# Patient Record
Sex: MALE | Race: BLACK OR AFRICAN AMERICAN | NOT HISPANIC OR LATINO | Employment: FULL TIME | URBAN - METROPOLITAN AREA
[De-identification: names, ages, dates, MRNs, and addresses within clinical notes are randomized per-mention and may not be internally consistent; named-entity substitution may affect disease eponyms.]

---

## 2017-01-17 ENCOUNTER — OFFICE VISIT - HEALTHEAST (OUTPATIENT)
Dept: FAMILY MEDICINE | Facility: CLINIC | Age: 27
End: 2017-01-17

## 2017-01-17 DIAGNOSIS — G43.109 MIGRAINE WITH AURA AND WITHOUT STATUS MIGRAINOSUS, NOT INTRACTABLE: ICD-10-CM

## 2017-01-17 DIAGNOSIS — M25.461 SWELLING OF RIGHT KNEE JOINT: ICD-10-CM

## 2017-01-17 DIAGNOSIS — Z98.890 S/P ACL RECONSTRUCTION: ICD-10-CM

## 2017-01-17 ASSESSMENT — MIFFLIN-ST. JEOR: SCORE: 2006.4

## 2017-03-21 ENCOUNTER — OFFICE VISIT - HEALTHEAST (OUTPATIENT)
Dept: FAMILY MEDICINE | Facility: CLINIC | Age: 27
End: 2017-03-21

## 2017-03-21 DIAGNOSIS — R73.01 IMPAIRED FASTING GLUCOSE: ICD-10-CM

## 2017-03-21 DIAGNOSIS — Z00.00 WELL ADULT EXAM: ICD-10-CM

## 2017-03-21 DIAGNOSIS — F90.9 ADHD (ATTENTION DEFICIT HYPERACTIVITY DISORDER): ICD-10-CM

## 2017-03-21 DIAGNOSIS — Z86.19 HISTORY OF CHLAMYDIA INFECTION: ICD-10-CM

## 2017-03-21 LAB
CHOLEST SERPL-MCNC: 134 MG/DL
FASTING STATUS PATIENT QL REPORTED: YES
HBA1C MFR BLD: 5.5 % (ref 3.5–6)
HDLC SERPL-MCNC: 59 MG/DL
LDLC SERPL CALC-MCNC: 62 MG/DL
TRIGL SERPL-MCNC: 65 MG/DL

## 2017-03-21 ASSESSMENT — MIFFLIN-ST. JEOR: SCORE: 1982.02

## 2017-05-16 ENCOUNTER — COMMUNICATION - HEALTHEAST (OUTPATIENT)
Dept: FAMILY MEDICINE | Facility: CLINIC | Age: 27
End: 2017-05-16

## 2017-05-16 ENCOUNTER — OFFICE VISIT - HEALTHEAST (OUTPATIENT)
Dept: FAMILY MEDICINE | Facility: CLINIC | Age: 27
End: 2017-05-16

## 2017-05-16 ENCOUNTER — AMBULATORY - HEALTHEAST (OUTPATIENT)
Dept: FAMILY MEDICINE | Facility: CLINIC | Age: 27
End: 2017-05-16

## 2017-05-16 DIAGNOSIS — Z86.19 HISTORY OF CHLAMYDIA INFECTION: ICD-10-CM

## 2017-05-16 DIAGNOSIS — Z11.3 SCREEN FOR STD (SEXUALLY TRANSMITTED DISEASE): ICD-10-CM

## 2017-05-16 DIAGNOSIS — F90.9 ADHD (ATTENTION DEFICIT HYPERACTIVITY DISORDER): ICD-10-CM

## 2017-05-16 DIAGNOSIS — J02.9 SORE THROAT: ICD-10-CM

## 2017-05-16 ASSESSMENT — MIFFLIN-ST. JEOR: SCORE: 1997.05

## 2017-05-30 ENCOUNTER — COMMUNICATION - HEALTHEAST (OUTPATIENT)
Dept: FAMILY MEDICINE | Facility: CLINIC | Age: 27
End: 2017-05-30

## 2017-07-24 ENCOUNTER — OFFICE VISIT - HEALTHEAST (OUTPATIENT)
Dept: FAMILY MEDICINE | Facility: CLINIC | Age: 27
End: 2017-07-24

## 2017-07-24 DIAGNOSIS — Z11.3 ROUTINE SCREENING FOR STI (SEXUALLY TRANSMITTED INFECTION): ICD-10-CM

## 2017-07-24 DIAGNOSIS — F90.9 ADHD (ATTENTION DEFICIT HYPERACTIVITY DISORDER): ICD-10-CM

## 2017-07-24 LAB — HIV 1+2 AB+HIV1 P24 AG SERPL QL IA: NEGATIVE

## 2017-07-24 ASSESSMENT — MIFFLIN-ST. JEOR: SCORE: 1999.6

## 2017-07-25 LAB — SYPHILIS RPR SCREEN - HISTORICAL: NORMAL

## 2017-08-02 ENCOUNTER — COMMUNICATION - HEALTHEAST (OUTPATIENT)
Dept: FAMILY MEDICINE | Facility: CLINIC | Age: 27
End: 2017-08-02

## 2017-08-15 ENCOUNTER — COMMUNICATION - HEALTHEAST (OUTPATIENT)
Dept: FAMILY MEDICINE | Facility: CLINIC | Age: 27
End: 2017-08-15

## 2017-08-23 ENCOUNTER — OFFICE VISIT - HEALTHEAST (OUTPATIENT)
Dept: FAMILY MEDICINE | Facility: CLINIC | Age: 27
End: 2017-08-23

## 2017-08-23 DIAGNOSIS — F90.9 ADHD (ATTENTION DEFICIT HYPERACTIVITY DISORDER): ICD-10-CM

## 2017-08-23 ASSESSMENT — MIFFLIN-ST. JEOR: SCORE: 2027.95

## 2017-09-07 ENCOUNTER — RECORDS - HEALTHEAST (OUTPATIENT)
Dept: ADMINISTRATIVE | Facility: OTHER | Age: 27
End: 2017-09-07

## 2017-10-05 ENCOUNTER — OFFICE VISIT - HEALTHEAST (OUTPATIENT)
Dept: FAMILY MEDICINE | Facility: CLINIC | Age: 27
End: 2017-10-05

## 2017-10-05 DIAGNOSIS — F33.1 MODERATE EPISODE OF RECURRENT MAJOR DEPRESSIVE DISORDER (H): ICD-10-CM

## 2017-10-05 DIAGNOSIS — R82.90 ABNORMAL URINE ODOR: ICD-10-CM

## 2017-10-05 DIAGNOSIS — F41.9 ANXIETY: ICD-10-CM

## 2017-10-05 ASSESSMENT — MIFFLIN-ST. JEOR: SCORE: 2046.49

## 2017-10-23 ENCOUNTER — COMMUNICATION - HEALTHEAST (OUTPATIENT)
Dept: FAMILY MEDICINE | Facility: CLINIC | Age: 27
End: 2017-10-23

## 2017-11-01 ENCOUNTER — COMMUNICATION - HEALTHEAST (OUTPATIENT)
Dept: FAMILY MEDICINE | Facility: CLINIC | Age: 27
End: 2017-11-01

## 2017-12-18 ENCOUNTER — COMMUNICATION - HEALTHEAST (OUTPATIENT)
Dept: FAMILY MEDICINE | Facility: CLINIC | Age: 27
End: 2017-12-18

## 2017-12-26 ENCOUNTER — OFFICE VISIT - HEALTHEAST (OUTPATIENT)
Dept: FAMILY MEDICINE | Facility: CLINIC | Age: 27
End: 2017-12-26

## 2017-12-26 DIAGNOSIS — Z20.2 EXPOSURE TO STD: ICD-10-CM

## 2017-12-26 DIAGNOSIS — R30.0 DYSURIA: ICD-10-CM

## 2017-12-26 DIAGNOSIS — Z20.2 STD EXPOSURE: ICD-10-CM

## 2017-12-28 ENCOUNTER — AMBULATORY - HEALTHEAST (OUTPATIENT)
Dept: FAMILY MEDICINE | Facility: CLINIC | Age: 27
End: 2017-12-28

## 2017-12-28 ENCOUNTER — COMMUNICATION - HEALTHEAST (OUTPATIENT)
Dept: FAMILY MEDICINE | Facility: CLINIC | Age: 27
End: 2017-12-28

## 2018-01-18 ENCOUNTER — AMBULATORY - HEALTHEAST (OUTPATIENT)
Dept: FAMILY MEDICINE | Facility: CLINIC | Age: 28
End: 2018-01-18

## 2018-02-12 ENCOUNTER — OFFICE VISIT - HEALTHEAST (OUTPATIENT)
Dept: FAMILY MEDICINE | Facility: CLINIC | Age: 28
End: 2018-02-12

## 2018-02-12 DIAGNOSIS — Z20.2 EXPOSURE TO CHLAMYDIA: ICD-10-CM

## 2018-02-12 DIAGNOSIS — F90.9 ADHD (ATTENTION DEFICIT HYPERACTIVITY DISORDER): ICD-10-CM

## 2018-02-12 DIAGNOSIS — F41.9 ANXIETY: ICD-10-CM

## 2018-02-12 DIAGNOSIS — Z11.3 SCREEN FOR STD (SEXUALLY TRANSMITTED DISEASE): ICD-10-CM

## 2018-02-12 DIAGNOSIS — F33.1 MODERATE EPISODE OF RECURRENT MAJOR DEPRESSIVE DISORDER (H): ICD-10-CM

## 2018-02-12 LAB — HIV 1+2 AB+HIV1 P24 AG SERPL QL IA: NEGATIVE

## 2018-02-12 ASSESSMENT — MIFFLIN-ST. JEOR: SCORE: 2062.82

## 2018-02-13 LAB
C TRACH DNA SPEC QL PROBE+SIG AMP: NEGATIVE
N GONORRHOEA DNA SPEC QL NAA+PROBE: NEGATIVE
SYPHILIS RPR SCREEN - HISTORICAL: NORMAL

## 2018-03-23 ENCOUNTER — COMMUNICATION - HEALTHEAST (OUTPATIENT)
Dept: FAMILY MEDICINE | Facility: CLINIC | Age: 28
End: 2018-03-23

## 2018-04-21 ENCOUNTER — COMMUNICATION - HEALTHEAST (OUTPATIENT)
Dept: FAMILY MEDICINE | Facility: CLINIC | Age: 28
End: 2018-04-21

## 2018-05-07 ENCOUNTER — OFFICE VISIT - HEALTHEAST (OUTPATIENT)
Dept: FAMILY MEDICINE | Facility: CLINIC | Age: 28
End: 2018-05-07

## 2018-05-07 DIAGNOSIS — F33.1 MODERATE EPISODE OF RECURRENT MAJOR DEPRESSIVE DISORDER (H): ICD-10-CM

## 2018-05-07 DIAGNOSIS — F41.9 ANXIETY: ICD-10-CM

## 2018-05-07 DIAGNOSIS — Z13.1 ENCOUNTER FOR SCREENING FOR DIABETES MELLITUS: ICD-10-CM

## 2018-05-07 DIAGNOSIS — Z13.220 ENCOUNTER FOR SCREENING FOR LIPOID DISORDERS: ICD-10-CM

## 2018-05-07 DIAGNOSIS — Z11.3 SCREEN FOR STD (SEXUALLY TRANSMITTED DISEASE): ICD-10-CM

## 2018-05-07 DIAGNOSIS — F90.9 ADHD (ATTENTION DEFICIT HYPERACTIVITY DISORDER): ICD-10-CM

## 2018-05-07 DIAGNOSIS — G43.909 MIGRAINE: ICD-10-CM

## 2018-05-07 DIAGNOSIS — Z00.00 ENCOUNTER FOR GENERAL ADULT MEDICAL EXAMINATION WITHOUT ABNORMAL FINDINGS: ICD-10-CM

## 2018-05-07 ASSESSMENT — MIFFLIN-ST. JEOR: SCORE: 2027.95

## 2018-05-08 LAB
C TRACH DNA SPEC QL PROBE+SIG AMP: NEGATIVE
N GONORRHOEA DNA SPEC QL NAA+PROBE: NEGATIVE

## 2018-05-23 ENCOUNTER — COMMUNICATION - HEALTHEAST (OUTPATIENT)
Dept: FAMILY MEDICINE | Facility: CLINIC | Age: 28
End: 2018-05-23

## 2018-06-19 ENCOUNTER — COMMUNICATION - HEALTHEAST (OUTPATIENT)
Dept: FAMILY MEDICINE | Facility: CLINIC | Age: 28
End: 2018-06-19

## 2018-07-12 ENCOUNTER — COMMUNICATION - HEALTHEAST (OUTPATIENT)
Dept: FAMILY MEDICINE | Facility: CLINIC | Age: 28
End: 2018-07-12

## 2018-07-21 ENCOUNTER — COMMUNICATION - HEALTHEAST (OUTPATIENT)
Dept: FAMILY MEDICINE | Facility: CLINIC | Age: 28
End: 2018-07-21

## 2018-08-16 ENCOUNTER — COMMUNICATION - HEALTHEAST (OUTPATIENT)
Dept: FAMILY MEDICINE | Facility: CLINIC | Age: 28
End: 2018-08-16

## 2018-08-19 ENCOUNTER — COMMUNICATION - HEALTHEAST (OUTPATIENT)
Dept: FAMILY MEDICINE | Facility: CLINIC | Age: 28
End: 2018-08-19

## 2018-09-17 ENCOUNTER — COMMUNICATION - HEALTHEAST (OUTPATIENT)
Dept: FAMILY MEDICINE | Facility: CLINIC | Age: 28
End: 2018-09-17

## 2018-10-17 ENCOUNTER — COMMUNICATION - HEALTHEAST (OUTPATIENT)
Dept: FAMILY MEDICINE | Facility: CLINIC | Age: 28
End: 2018-10-17

## 2018-11-16 ENCOUNTER — COMMUNICATION - HEALTHEAST (OUTPATIENT)
Dept: FAMILY MEDICINE | Facility: CLINIC | Age: 28
End: 2018-11-16

## 2018-12-05 ENCOUNTER — OFFICE VISIT - HEALTHEAST (OUTPATIENT)
Dept: FAMILY MEDICINE | Facility: CLINIC | Age: 28
End: 2018-12-05

## 2018-12-05 DIAGNOSIS — F41.9 ANXIETY: ICD-10-CM

## 2018-12-05 DIAGNOSIS — F90.2 ATTENTION DEFICIT HYPERACTIVITY DISORDER (ADHD), COMBINED TYPE: ICD-10-CM

## 2018-12-05 DIAGNOSIS — Z13.220 ENCOUNTER FOR SCREENING FOR LIPOID DISORDERS: ICD-10-CM

## 2018-12-05 DIAGNOSIS — F33.1 MODERATE EPISODE OF RECURRENT MAJOR DEPRESSIVE DISORDER (H): ICD-10-CM

## 2018-12-05 DIAGNOSIS — Z11.3 SCREEN FOR STD (SEXUALLY TRANSMITTED DISEASE): ICD-10-CM

## 2018-12-05 DIAGNOSIS — Z13.1 ENCOUNTER FOR SCREENING FOR DIABETES MELLITUS: ICD-10-CM

## 2018-12-05 DIAGNOSIS — G43.109 MIGRAINE WITH AURA AND WITHOUT STATUS MIGRAINOSUS, NOT INTRACTABLE: ICD-10-CM

## 2018-12-05 ASSESSMENT — MIFFLIN-ST. JEOR: SCORE: 2107.27

## 2018-12-06 LAB
C TRACH DNA SPEC QL PROBE+SIG AMP: NEGATIVE
CHOLEST SERPL-MCNC: 161 MG/DL
FASTING STATUS PATIENT QL REPORTED: NO
FASTING STATUS PATIENT QL REPORTED: NO
GLUCOSE BLD-MCNC: 86 MG/DL (ref 70–125)
HDLC SERPL-MCNC: 55 MG/DL
HIV 1+2 AB+HIV1 P24 AG SERPL QL IA: NEGATIVE
LDLC SERPL CALC-MCNC: 85 MG/DL
N GONORRHOEA DNA SPEC QL NAA+PROBE: NEGATIVE
TRIGL SERPL-MCNC: 103 MG/DL

## 2018-12-07 LAB — T PALLIDUM AB SER QL: NEGATIVE

## 2018-12-17 ENCOUNTER — COMMUNICATION - HEALTHEAST (OUTPATIENT)
Dept: FAMILY MEDICINE | Facility: CLINIC | Age: 28
End: 2018-12-17

## 2019-01-17 ENCOUNTER — COMMUNICATION - HEALTHEAST (OUTPATIENT)
Dept: FAMILY MEDICINE | Facility: CLINIC | Age: 29
End: 2019-01-17

## 2019-01-22 ENCOUNTER — AMBULATORY - HEALTHEAST (OUTPATIENT)
Dept: FAMILY MEDICINE | Facility: CLINIC | Age: 29
End: 2019-01-22

## 2019-01-22 ENCOUNTER — COMMUNICATION - HEALTHEAST (OUTPATIENT)
Dept: FAMILY MEDICINE | Facility: CLINIC | Age: 29
End: 2019-01-22

## 2019-02-18 ENCOUNTER — COMMUNICATION - HEALTHEAST (OUTPATIENT)
Dept: FAMILY MEDICINE | Facility: CLINIC | Age: 29
End: 2019-02-18

## 2019-03-20 ENCOUNTER — COMMUNICATION - HEALTHEAST (OUTPATIENT)
Dept: FAMILY MEDICINE | Facility: CLINIC | Age: 29
End: 2019-03-20

## 2019-03-20 DIAGNOSIS — F90.2 ATTENTION DEFICIT HYPERACTIVITY DISORDER (ADHD), COMBINED TYPE: ICD-10-CM

## 2019-04-18 ENCOUNTER — COMMUNICATION - HEALTHEAST (OUTPATIENT)
Dept: FAMILY MEDICINE | Facility: CLINIC | Age: 29
End: 2019-04-18

## 2019-04-18 ENCOUNTER — AMBULATORY - HEALTHEAST (OUTPATIENT)
Dept: FAMILY MEDICINE | Facility: CLINIC | Age: 29
End: 2019-04-18

## 2019-04-18 DIAGNOSIS — F33.1 MODERATE EPISODE OF RECURRENT MAJOR DEPRESSIVE DISORDER (H): ICD-10-CM

## 2019-04-18 DIAGNOSIS — F90.2 ATTENTION DEFICIT HYPERACTIVITY DISORDER (ADHD), COMBINED TYPE: ICD-10-CM

## 2019-05-17 ENCOUNTER — COMMUNICATION - HEALTHEAST (OUTPATIENT)
Dept: FAMILY MEDICINE | Facility: CLINIC | Age: 29
End: 2019-05-17

## 2019-05-17 DIAGNOSIS — F90.2 ATTENTION DEFICIT HYPERACTIVITY DISORDER (ADHD), COMBINED TYPE: ICD-10-CM

## 2019-05-31 ENCOUNTER — OFFICE VISIT - HEALTHEAST (OUTPATIENT)
Dept: FAMILY MEDICINE | Facility: CLINIC | Age: 29
End: 2019-05-31

## 2019-05-31 DIAGNOSIS — A64 STI (SEXUALLY TRANSMITTED INFECTION): ICD-10-CM

## 2019-06-03 LAB
C TRACH DNA SPEC QL PROBE+SIG AMP: NEGATIVE
N GONORRHOEA DNA SPEC QL NAA+PROBE: NEGATIVE

## 2019-06-04 ENCOUNTER — COMMUNICATION - HEALTHEAST (OUTPATIENT)
Dept: FAMILY MEDICINE | Facility: CLINIC | Age: 29
End: 2019-06-04

## 2019-06-19 ENCOUNTER — COMMUNICATION - HEALTHEAST (OUTPATIENT)
Dept: FAMILY MEDICINE | Facility: CLINIC | Age: 29
End: 2019-06-19

## 2019-06-19 DIAGNOSIS — F90.2 ATTENTION DEFICIT HYPERACTIVITY DISORDER (ADHD), COMBINED TYPE: ICD-10-CM

## 2019-07-16 ENCOUNTER — COMMUNICATION - HEALTHEAST (OUTPATIENT)
Dept: FAMILY MEDICINE | Facility: CLINIC | Age: 29
End: 2019-07-16

## 2019-07-16 DIAGNOSIS — F90.2 ATTENTION DEFICIT HYPERACTIVITY DISORDER (ADHD), COMBINED TYPE: ICD-10-CM

## 2019-08-05 ENCOUNTER — COMMUNICATION - HEALTHEAST (OUTPATIENT)
Dept: FAMILY MEDICINE | Facility: CLINIC | Age: 29
End: 2019-08-05

## 2019-08-05 DIAGNOSIS — F33.1 MODERATE EPISODE OF RECURRENT MAJOR DEPRESSIVE DISORDER (H): ICD-10-CM

## 2019-08-07 ENCOUNTER — OFFICE VISIT - HEALTHEAST (OUTPATIENT)
Dept: FAMILY MEDICINE | Facility: CLINIC | Age: 29
End: 2019-08-07

## 2019-08-07 DIAGNOSIS — Z22.39 CARRIER OF UREAPLASMA UREALYTICUM: ICD-10-CM

## 2019-08-19 ENCOUNTER — COMMUNICATION - HEALTHEAST (OUTPATIENT)
Dept: FAMILY MEDICINE | Facility: CLINIC | Age: 29
End: 2019-08-19

## 2019-08-19 DIAGNOSIS — F90.2 ATTENTION DEFICIT HYPERACTIVITY DISORDER (ADHD), COMBINED TYPE: ICD-10-CM

## 2019-09-16 ENCOUNTER — COMMUNICATION - HEALTHEAST (OUTPATIENT)
Dept: FAMILY MEDICINE | Facility: CLINIC | Age: 29
End: 2019-09-16

## 2019-09-16 DIAGNOSIS — F90.2 ATTENTION DEFICIT HYPERACTIVITY DISORDER (ADHD), COMBINED TYPE: ICD-10-CM

## 2019-10-14 ENCOUNTER — COMMUNICATION - HEALTHEAST (OUTPATIENT)
Dept: FAMILY MEDICINE | Facility: CLINIC | Age: 29
End: 2019-10-14

## 2019-10-14 DIAGNOSIS — F90.2 ATTENTION DEFICIT HYPERACTIVITY DISORDER (ADHD), COMBINED TYPE: ICD-10-CM

## 2019-11-11 ENCOUNTER — COMMUNICATION - HEALTHEAST (OUTPATIENT)
Dept: FAMILY MEDICINE | Facility: CLINIC | Age: 29
End: 2019-11-11

## 2019-11-11 DIAGNOSIS — F90.2 ATTENTION DEFICIT HYPERACTIVITY DISORDER (ADHD), COMBINED TYPE: ICD-10-CM

## 2019-12-09 ENCOUNTER — COMMUNICATION - HEALTHEAST (OUTPATIENT)
Dept: FAMILY MEDICINE | Facility: CLINIC | Age: 29
End: 2019-12-09

## 2019-12-09 DIAGNOSIS — F90.2 ATTENTION DEFICIT HYPERACTIVITY DISORDER (ADHD), COMBINED TYPE: ICD-10-CM

## 2020-01-07 ENCOUNTER — OFFICE VISIT - HEALTHEAST (OUTPATIENT)
Dept: FAMILY MEDICINE | Facility: CLINIC | Age: 30
End: 2020-01-07

## 2020-01-07 ENCOUNTER — COMMUNICATION - HEALTHEAST (OUTPATIENT)
Dept: FAMILY MEDICINE | Facility: CLINIC | Age: 30
End: 2020-01-07

## 2020-01-07 DIAGNOSIS — G43.109 MIGRAINE WITH AURA AND WITHOUT STATUS MIGRAINOSUS, NOT INTRACTABLE: ICD-10-CM

## 2020-01-07 DIAGNOSIS — Z00.00 ENCOUNTER FOR GENERAL ADULT MEDICAL EXAMINATION WITHOUT ABNORMAL FINDINGS: ICD-10-CM

## 2020-01-07 DIAGNOSIS — F90.2 ATTENTION DEFICIT HYPERACTIVITY DISORDER (ADHD), COMBINED TYPE: ICD-10-CM

## 2020-01-07 DIAGNOSIS — Z13.1 SCREENING FOR DIABETES MELLITUS: ICD-10-CM

## 2020-01-07 DIAGNOSIS — F33.1 MODERATE EPISODE OF RECURRENT MAJOR DEPRESSIVE DISORDER (H): ICD-10-CM

## 2020-01-07 DIAGNOSIS — F41.9 ANXIETY: ICD-10-CM

## 2020-01-07 DIAGNOSIS — Z13.220 SCREENING CHOLESTEROL LEVEL: ICD-10-CM

## 2020-01-07 DIAGNOSIS — Z11.3 SCREEN FOR STD (SEXUALLY TRANSMITTED DISEASE): ICD-10-CM

## 2020-01-07 LAB
ANION GAP SERPL CALCULATED.3IONS-SCNC: 10 MMOL/L (ref 5–18)
BUN SERPL-MCNC: 16 MG/DL (ref 8–22)
CALCIUM SERPL-MCNC: 9.9 MG/DL (ref 8.5–10.5)
CHLORIDE BLD-SCNC: 104 MMOL/L (ref 98–107)
CHOLEST SERPL-MCNC: 173 MG/DL
CO2 SERPL-SCNC: 25 MMOL/L (ref 22–31)
CREAT SERPL-MCNC: 1.1 MG/DL (ref 0.7–1.3)
FASTING STATUS PATIENT QL REPORTED: YES
GFR SERPL CREATININE-BSD FRML MDRD: >60 ML/MIN/1.73M2
GLUCOSE BLD-MCNC: 86 MG/DL (ref 70–125)
HDLC SERPL-MCNC: 61 MG/DL
HIV 1+2 AB+HIV1 P24 AG SERPL QL IA: NEGATIVE
LDLC SERPL CALC-MCNC: 99 MG/DL
POTASSIUM BLD-SCNC: 4.1 MMOL/L (ref 3.5–5)
SODIUM SERPL-SCNC: 139 MMOL/L (ref 136–145)
TRIGL SERPL-MCNC: 64 MG/DL

## 2020-01-07 ASSESSMENT — ANXIETY QUESTIONNAIRES
4. TROUBLE RELAXING: NOT AT ALL
3. WORRYING TOO MUCH ABOUT DIFFERENT THINGS: SEVERAL DAYS
1. FEELING NERVOUS, ANXIOUS, OR ON EDGE: NOT AT ALL
6. BECOMING EASILY ANNOYED OR IRRITABLE: NOT AT ALL
7. FEELING AFRAID AS IF SOMETHING AWFUL MIGHT HAPPEN: NOT AT ALL
GAD7 TOTAL SCORE: 2
2. NOT BEING ABLE TO STOP OR CONTROL WORRYING: SEVERAL DAYS
5. BEING SO RESTLESS THAT IT IS HARD TO SIT STILL: NOT AT ALL

## 2020-01-07 ASSESSMENT — PATIENT HEALTH QUESTIONNAIRE - PHQ9: SUM OF ALL RESPONSES TO PHQ QUESTIONS 1-9: 1

## 2020-01-07 ASSESSMENT — MIFFLIN-ST. JEOR: SCORE: 2126.89

## 2020-01-08 LAB
C TRACH DNA SPEC QL PROBE+SIG AMP: NEGATIVE
N GONORRHOEA DNA SPEC QL NAA+PROBE: NEGATIVE
T PALLIDUM AB SER QL: NEGATIVE

## 2020-01-10 ENCOUNTER — COMMUNICATION - HEALTHEAST (OUTPATIENT)
Dept: FAMILY MEDICINE | Facility: CLINIC | Age: 30
End: 2020-01-10

## 2020-01-13 ENCOUNTER — AMBULATORY - HEALTHEAST (OUTPATIENT)
Dept: FAMILY MEDICINE | Facility: CLINIC | Age: 30
End: 2020-01-13

## 2020-01-13 DIAGNOSIS — Z13.1 SCREENING FOR DIABETES MELLITUS: ICD-10-CM

## 2020-01-30 ENCOUNTER — COMMUNICATION - HEALTHEAST (OUTPATIENT)
Dept: FAMILY MEDICINE | Facility: CLINIC | Age: 30
End: 2020-01-30

## 2020-02-04 ENCOUNTER — COMMUNICATION - HEALTHEAST (OUTPATIENT)
Dept: FAMILY MEDICINE | Facility: CLINIC | Age: 30
End: 2020-02-04

## 2020-02-04 DIAGNOSIS — F90.2 ATTENTION DEFICIT HYPERACTIVITY DISORDER (ADHD), COMBINED TYPE: ICD-10-CM

## 2020-02-05 ENCOUNTER — AMBULATORY - HEALTHEAST (OUTPATIENT)
Dept: LAB | Facility: CLINIC | Age: 30
End: 2020-02-05

## 2020-02-05 DIAGNOSIS — Z13.1 SCREENING FOR DIABETES MELLITUS: ICD-10-CM

## 2020-02-05 LAB — HBA1C MFR BLD: 5.2 % (ref 3.5–6)

## 2020-03-04 ENCOUNTER — COMMUNICATION - HEALTHEAST (OUTPATIENT)
Dept: FAMILY MEDICINE | Facility: CLINIC | Age: 30
End: 2020-03-04

## 2020-03-04 DIAGNOSIS — F90.2 ATTENTION DEFICIT HYPERACTIVITY DISORDER (ADHD), COMBINED TYPE: ICD-10-CM

## 2020-03-05 ENCOUNTER — COMMUNICATION - HEALTHEAST (OUTPATIENT)
Dept: FAMILY MEDICINE | Facility: CLINIC | Age: 30
End: 2020-03-05

## 2020-03-05 DIAGNOSIS — F90.2 ATTENTION DEFICIT HYPERACTIVITY DISORDER (ADHD), COMBINED TYPE: ICD-10-CM

## 2020-03-06 ENCOUNTER — AMBULATORY - HEALTHEAST (OUTPATIENT)
Dept: FAMILY MEDICINE | Facility: CLINIC | Age: 30
End: 2020-03-06

## 2020-03-06 DIAGNOSIS — F90.2 ATTENTION DEFICIT HYPERACTIVITY DISORDER (ADHD), COMBINED TYPE: ICD-10-CM

## 2020-03-12 ENCOUNTER — COMMUNICATION - HEALTHEAST (OUTPATIENT)
Dept: FAMILY MEDICINE | Facility: CLINIC | Age: 30
End: 2020-03-12

## 2020-03-16 ENCOUNTER — RECORDS - HEALTHEAST (OUTPATIENT)
Dept: ADMINISTRATIVE | Facility: OTHER | Age: 30
End: 2020-03-16

## 2020-03-30 ENCOUNTER — RECORDS - HEALTHEAST (OUTPATIENT)
Dept: ADMINISTRATIVE | Facility: OTHER | Age: 30
End: 2020-03-30

## 2020-04-03 ENCOUNTER — COMMUNICATION - HEALTHEAST (OUTPATIENT)
Dept: FAMILY MEDICINE | Facility: CLINIC | Age: 30
End: 2020-04-03

## 2020-04-03 DIAGNOSIS — F90.2 ATTENTION DEFICIT HYPERACTIVITY DISORDER (ADHD), COMBINED TYPE: ICD-10-CM

## 2020-04-13 ENCOUNTER — RECORDS - HEALTHEAST (OUTPATIENT)
Dept: ADMINISTRATIVE | Facility: OTHER | Age: 30
End: 2020-04-13

## 2020-05-05 ENCOUNTER — COMMUNICATION - HEALTHEAST (OUTPATIENT)
Dept: FAMILY MEDICINE | Facility: CLINIC | Age: 30
End: 2020-05-05

## 2020-05-05 DIAGNOSIS — F90.2 ATTENTION DEFICIT HYPERACTIVITY DISORDER (ADHD), COMBINED TYPE: ICD-10-CM

## 2020-06-01 ENCOUNTER — COMMUNICATION - HEALTHEAST (OUTPATIENT)
Dept: FAMILY MEDICINE | Facility: CLINIC | Age: 30
End: 2020-06-01

## 2020-06-01 DIAGNOSIS — F90.2 ATTENTION DEFICIT HYPERACTIVITY DISORDER (ADHD), COMBINED TYPE: ICD-10-CM

## 2020-07-06 ENCOUNTER — COMMUNICATION - HEALTHEAST (OUTPATIENT)
Dept: FAMILY MEDICINE | Facility: CLINIC | Age: 30
End: 2020-07-06

## 2020-07-06 DIAGNOSIS — F90.2 ATTENTION DEFICIT HYPERACTIVITY DISORDER (ADHD), COMBINED TYPE: ICD-10-CM

## 2020-08-03 ENCOUNTER — COMMUNICATION - HEALTHEAST (OUTPATIENT)
Dept: FAMILY MEDICINE | Facility: CLINIC | Age: 30
End: 2020-08-03

## 2020-08-03 DIAGNOSIS — F90.2 ATTENTION DEFICIT HYPERACTIVITY DISORDER (ADHD), COMBINED TYPE: ICD-10-CM

## 2020-09-01 ENCOUNTER — COMMUNICATION - HEALTHEAST (OUTPATIENT)
Dept: FAMILY MEDICINE | Facility: CLINIC | Age: 30
End: 2020-09-01

## 2020-09-01 DIAGNOSIS — F90.2 ATTENTION DEFICIT HYPERACTIVITY DISORDER (ADHD), COMBINED TYPE: ICD-10-CM

## 2020-09-30 ENCOUNTER — COMMUNICATION - HEALTHEAST (OUTPATIENT)
Dept: FAMILY MEDICINE | Facility: CLINIC | Age: 30
End: 2020-09-30

## 2020-09-30 DIAGNOSIS — F90.2 ATTENTION DEFICIT HYPERACTIVITY DISORDER (ADHD), COMBINED TYPE: ICD-10-CM

## 2020-10-08 ENCOUNTER — OFFICE VISIT - HEALTHEAST (OUTPATIENT)
Dept: FAMILY MEDICINE | Facility: CLINIC | Age: 30
End: 2020-10-08

## 2020-10-08 DIAGNOSIS — F33.1 MODERATE EPISODE OF RECURRENT MAJOR DEPRESSIVE DISORDER (H): ICD-10-CM

## 2020-10-08 DIAGNOSIS — L98.9 SKIN LESION: ICD-10-CM

## 2020-10-08 DIAGNOSIS — F90.2 ATTENTION DEFICIT HYPERACTIVITY DISORDER (ADHD), COMBINED TYPE: ICD-10-CM

## 2020-10-08 DIAGNOSIS — F41.9 ANXIETY: ICD-10-CM

## 2020-10-08 ASSESSMENT — MIFFLIN-ST. JEOR: SCORE: 2120.65

## 2020-11-02 ENCOUNTER — COMMUNICATION - HEALTHEAST (OUTPATIENT)
Dept: FAMILY MEDICINE | Facility: CLINIC | Age: 30
End: 2020-11-02

## 2020-11-02 DIAGNOSIS — F90.2 ATTENTION DEFICIT HYPERACTIVITY DISORDER (ADHD), COMBINED TYPE: ICD-10-CM

## 2020-11-09 ENCOUNTER — COMMUNICATION - HEALTHEAST (OUTPATIENT)
Dept: FAMILY MEDICINE | Facility: CLINIC | Age: 30
End: 2020-11-09

## 2020-11-10 ENCOUNTER — OFFICE VISIT - HEALTHEAST (OUTPATIENT)
Dept: FAMILY MEDICINE | Facility: CLINIC | Age: 30
End: 2020-11-10

## 2020-11-10 DIAGNOSIS — R10.13 ABDOMINAL PAIN, EPIGASTRIC: ICD-10-CM

## 2020-11-10 DIAGNOSIS — G43.109 MIGRAINE WITH AURA AND WITHOUT STATUS MIGRAINOSUS, NOT INTRACTABLE: ICD-10-CM

## 2020-11-10 ASSESSMENT — MIFFLIN-ST. JEOR: SCORE: 2143.33

## 2020-11-23 ENCOUNTER — AMBULATORY - HEALTHEAST (OUTPATIENT)
Dept: FAMILY MEDICINE | Facility: CLINIC | Age: 30
End: 2020-11-23

## 2020-11-23 ENCOUNTER — COMMUNICATION - HEALTHEAST (OUTPATIENT)
Dept: FAMILY MEDICINE | Facility: CLINIC | Age: 30
End: 2020-11-23

## 2020-11-23 DIAGNOSIS — G43.109 MIGRAINE WITH AURA AND WITHOUT STATUS MIGRAINOSUS, NOT INTRACTABLE: ICD-10-CM

## 2020-12-01 ENCOUNTER — COMMUNICATION - HEALTHEAST (OUTPATIENT)
Dept: FAMILY MEDICINE | Facility: CLINIC | Age: 30
End: 2020-12-01

## 2020-12-01 DIAGNOSIS — F90.2 ATTENTION DEFICIT HYPERACTIVITY DISORDER (ADHD), COMBINED TYPE: ICD-10-CM

## 2021-01-04 ENCOUNTER — COMMUNICATION - HEALTHEAST (OUTPATIENT)
Dept: FAMILY MEDICINE | Facility: CLINIC | Age: 31
End: 2021-01-04

## 2021-01-04 DIAGNOSIS — F90.2 ATTENTION DEFICIT HYPERACTIVITY DISORDER (ADHD), COMBINED TYPE: ICD-10-CM

## 2021-01-07 ENCOUNTER — COMMUNICATION - HEALTHEAST (OUTPATIENT)
Dept: FAMILY MEDICINE | Facility: CLINIC | Age: 31
End: 2021-01-07

## 2021-01-07 DIAGNOSIS — F90.2 ATTENTION DEFICIT HYPERACTIVITY DISORDER (ADHD), COMBINED TYPE: ICD-10-CM

## 2021-01-08 ENCOUNTER — TELEPHONE (OUTPATIENT)
Dept: FAMILY MEDICINE | Facility: CLINIC | Age: 31
End: 2021-01-08

## 2021-01-08 DIAGNOSIS — F90.2 ADHD (ATTENTION DEFICIT HYPERACTIVITY DISORDER), COMBINED TYPE: Primary | ICD-10-CM

## 2021-01-08 RX ORDER — DEXTROAMPHETAMINE SACCHARATE, AMPHETAMINE ASPARTATE, DEXTROAMPHETAMINE SULFATE AND AMPHETAMINE SULFATE 5; 5; 5; 5 MG/1; MG/1; MG/1; MG/1
20 TABLET ORAL DAILY
Qty: 60 TABLET | Refills: 0 | Status: SHIPPED | OUTPATIENT
Start: 2021-01-08 | End: 2021-02-07

## 2021-01-08 NOTE — TELEPHONE ENCOUNTER
Routing to PCP to advise   Unable to see any visits or refills in [facility]  Minoo ELIZABETH RN

## 2021-01-08 NOTE — TELEPHONE ENCOUNTER
Reason for Call:  Medication or medication refill:    Do you use a [facility]?  Name of the pharmacy and phone number for the current request:  [facility] [place] [address]  [phone]    Name of the medication requested: Adderall    Other request: patient is calling to get refill of his medication    Can we leave a detailed message on this number? YES    Phone number patient can be reached at: Home number on file [phone] (home)    Best Time:     Call taken on 1/8/2021 at 2:40 PM by Malia Vazquez

## 2021-01-11 NOTE — TELEPHONE ENCOUNTER
Tried to call pt to inform of rx, phone number on file is invalid. Attempted 3x.    Emperatriz Elmore MA on 1/11/2021 at 12:26 PM

## 2021-02-10 ENCOUNTER — TELEPHONE (OUTPATIENT)
Dept: FAMILY MEDICINE | Facility: CLINIC | Age: 31
End: 2021-02-10

## 2021-02-10 DIAGNOSIS — F90.2 ADHD (ATTENTION DEFICIT HYPERACTIVITY DISORDER), COMBINED TYPE: ICD-10-CM

## 2021-02-10 RX ORDER — DEXTROAMPHETAMINE SACCHARATE, AMPHETAMINE ASPARTATE, DEXTROAMPHETAMINE SULFATE AND AMPHETAMINE SULFATE 5; 5; 5; 5 MG/1; MG/1; MG/1; MG/1
20 TABLET ORAL 2 TIMES DAILY
Qty: 60 TABLET | Refills: 0 | Status: SHIPPED | OUTPATIENT
Start: 2021-02-10 | End: 2021-03-03

## 2021-02-10 NOTE — TELEPHONE ENCOUNTER
Reason for Call:  Medication or medication refill:    Do you use a [facility]?  Name of the pharmacy and phone number for the current request:  [facility]    Name of the medication requested:   Adderall 20mg    Other request: none    Can we leave a detailed message on this number? YES    Phone number patient can be reached at: Cell number on file:    Telephone Information:   Mobile [phone]       Best Time: any    Call taken on 2/10/2021 at 1:05 PM by Shelby Ravi

## 2021-02-10 NOTE — TELEPHONE ENCOUNTER
DE,    [facility] Patient.  Requesting refill for Adderall.    Thanks,  Laverne Rebolledo RN

## 2021-02-28 ENCOUNTER — HEALTH MAINTENANCE LETTER (OUTPATIENT)
Age: 31
End: 2021-02-28

## 2021-03-03 ENCOUNTER — MYC REFILL (OUTPATIENT)
Dept: FAMILY MEDICINE | Facility: CLINIC | Age: 31
End: 2021-03-03

## 2021-03-03 DIAGNOSIS — F90.2 ADHD (ATTENTION DEFICIT HYPERACTIVITY DISORDER), COMBINED TYPE: ICD-10-CM

## 2021-03-04 RX ORDER — DEXTROAMPHETAMINE SACCHARATE, AMPHETAMINE ASPARTATE, DEXTROAMPHETAMINE SULFATE AND AMPHETAMINE SULFATE 5; 5; 5; 5 MG/1; MG/1; MG/1; MG/1
20 TABLET ORAL 2 TIMES DAILY
Qty: 60 TABLET | Refills: 0 | Status: SHIPPED | OUTPATIENT
Start: 2021-03-04 | End: 2021-04-06

## 2021-03-04 NOTE — TELEPHONE ENCOUNTER
Routing refill request to provider for review/approval because:  Drug not on the [facility] refill protocol   Minoo ELIZABETH RN

## 2021-04-06 ENCOUNTER — MYC REFILL (OUTPATIENT)
Dept: FAMILY MEDICINE | Facility: CLINIC | Age: 31
End: 2021-04-06

## 2021-04-06 DIAGNOSIS — F90.2 ADHD (ATTENTION DEFICIT HYPERACTIVITY DISORDER), COMBINED TYPE: ICD-10-CM

## 2021-04-07 RX ORDER — DEXTROAMPHETAMINE SACCHARATE, AMPHETAMINE ASPARTATE, DEXTROAMPHETAMINE SULFATE AND AMPHETAMINE SULFATE 5; 5; 5; 5 MG/1; MG/1; MG/1; MG/1
20 TABLET ORAL 2 TIMES DAILY
Qty: 60 TABLET | Refills: 0 | Status: SHIPPED | OUTPATIENT
Start: 2021-04-07 | End: 2021-05-03

## 2021-04-15 ENCOUNTER — OFFICE VISIT (OUTPATIENT)
Dept: FAMILY MEDICINE | Facility: CLINIC | Age: 31
End: 2021-04-15
Payer: COMMERCIAL

## 2021-04-15 VITALS
TEMPERATURE: 98.2 F | OXYGEN SATURATION: 99 % | DIASTOLIC BLOOD PRESSURE: 82 MMHG | WEIGHT: 239.4 LBS | RESPIRATION RATE: 20 BRPM | BODY MASS INDEX: 29.15 KG/M2 | SYSTOLIC BLOOD PRESSURE: 137 MMHG | HEIGHT: 76 IN | HEART RATE: 100 BPM

## 2021-04-15 DIAGNOSIS — F90.2 ADHD (ATTENTION DEFICIT HYPERACTIVITY DISORDER), COMBINED TYPE: ICD-10-CM

## 2021-04-15 DIAGNOSIS — Z20.828 EXPOSURE TO HERPES SIMPLEX VIRUS (HSV): ICD-10-CM

## 2021-04-15 DIAGNOSIS — Z11.3 SCREENING FOR STDS (SEXUALLY TRANSMITTED DISEASES): Primary | ICD-10-CM

## 2021-04-15 PROCEDURE — 87591 N.GONORRHOEAE DNA AMP PROB: CPT | Performed by: FAMILY MEDICINE

## 2021-04-15 PROCEDURE — 86803 HEPATITIS C AB TEST: CPT | Performed by: FAMILY MEDICINE

## 2021-04-15 PROCEDURE — 99213 OFFICE O/P EST LOW 20 MIN: CPT | Performed by: FAMILY MEDICINE

## 2021-04-15 PROCEDURE — 99000 SPECIMEN HANDLING OFFICE-LAB: CPT | Performed by: FAMILY MEDICINE

## 2021-04-15 PROCEDURE — 87491 CHLMYD TRACH DNA AMP PROBE: CPT | Performed by: FAMILY MEDICINE

## 2021-04-15 PROCEDURE — 86780 TREPONEMA PALLIDUM: CPT | Mod: 90 | Performed by: FAMILY MEDICINE

## 2021-04-15 PROCEDURE — 87389 HIV-1 AG W/HIV-1&-2 AB AG IA: CPT | Performed by: FAMILY MEDICINE

## 2021-04-15 PROCEDURE — 36415 COLL VENOUS BLD VENIPUNCTURE: CPT | Performed by: FAMILY MEDICINE

## 2021-04-15 ASSESSMENT — MIFFLIN-ST. JEOR: SCORE: 2147.41

## 2021-04-15 NOTE — PROGRESS NOTES
Assessment & Plan     Screening for STDs (sexually transmitted diseases)  - Hepatitis C antibody  - HIV Antigen Antibody Combo  - Treponema Abs w Reflex to RPR and Titer  - NEISSERIA GONORRHOEA PCR  - CHLAMYDIA TRACHOMATIS PCR    Exposure to herpes simplex virus (HSV)    We decided to check labs listed above.  I explained that the checking HSV antibodies at this time would not be very helpful since he was just potentially exposed a few days ago to HSV-2.  Fortunately, his partner has been on prophylactic medication and told him that she did not have an outbreak at the time of their encounter.  Furthermore, he is not having any symptoms consistent with HSV at this time.  I explained what symptoms to look out for and he will return to the clinic if any of these develop.  He will be notified of the lab results once they are available.  We will consider checking HSV antibodies in the future.    ADHD, combined type  He continues to do well on Adderall without unwanted side effects.  He may continue to get refills of this medication.                 No follow-ups on file.    Guru Augustine, [facility]    Anamaria Sales is a 30 year old who presents for the following health issues     HPI     Pt would like STD testing - possible exposure, no sx's at this time.    He was recently on vacation in [place] and had a sexual encounter with someone who told him after that she has a history of genital herpes.  He was told that she did not have an outbreak at the time of that encounter and she is on prophylactic medication which has helped control this.  He denies having any sores or lesions on his penis.  He did feel some chafing which he attributes to hiking.  He does not have a history of cold sores or genital herpes.    He has a history of ADHD, combined type which has been well controlled on Adderall 20 mg twice daily.  He does not have unwanted side effects.          Review of  "Systems   Constitutional, HEENT, cardiovascular, pulmonary, gi and gu systems are negative, except as otherwise noted.      Objective    /82 (Patient Position: Sitting, Cuff Size: Adult Large)   Pulse 100   Temp 98.2  F (36.8  C) (Tympanic)   Resp 20   Ht 1.93 m (6' 4\")   Wt 108.6 kg (239 lb 6.4 oz)   SpO2 99%   BMI 29.14 kg/m    Body mass index is 29.14 kg/m .  Physical Exam   GENERAL: healthy, alert and no distress  EYES: Eyes grossly normal to inspection, conjunctivae and sclerae normal  MS: no gross musculoskeletal defects noted, no edema  SKIN: no suspicious lesions or rashes  NEURO: Normal strength and tone, mentation intact and speech normal  PSYCH: mentation appears normal, affect normal/bright  : Normal male genitalia.  No scrotal masses.  No lesions, vesicles, inflamed papules, ulcers or skin abnormalities in the genital region.                "

## 2021-04-16 LAB
C TRACH DNA SPEC QL NAA+PROBE: NEGATIVE
HCV AB SERPL QL IA: NONREACTIVE
HIV 1+2 AB+HIV1 P24 AG SERPL QL IA: NONREACTIVE
N GONORRHOEA DNA SPEC QL NAA+PROBE: NEGATIVE
SPECIMEN SOURCE: NORMAL
SPECIMEN SOURCE: NORMAL
T PALLIDUM AB SER QL: NONREACTIVE

## 2021-04-20 ENCOUNTER — MYC MEDICAL ADVICE (OUTPATIENT)
Dept: FAMILY MEDICINE | Facility: CLINIC | Age: 31
End: 2021-04-20

## 2021-04-20 DIAGNOSIS — Z20.828 EXPOSURE TO HERPES SIMPLEX VIRUS (HSV): Primary | ICD-10-CM

## 2021-04-23 DIAGNOSIS — Z20.828 EXPOSURE TO HERPES SIMPLEX VIRUS (HSV): ICD-10-CM

## 2021-04-23 PROCEDURE — 86695 HERPES SIMPLEX TYPE 1 TEST: CPT | Performed by: FAMILY MEDICINE

## 2021-04-23 PROCEDURE — 36415 COLL VENOUS BLD VENIPUNCTURE: CPT | Performed by: FAMILY MEDICINE

## 2021-04-23 PROCEDURE — 86696 HERPES SIMPLEX TYPE 2 TEST: CPT | Performed by: FAMILY MEDICINE

## 2021-04-26 ENCOUNTER — MYC MEDICAL ADVICE (OUTPATIENT)
Dept: FAMILY MEDICINE | Facility: CLINIC | Age: 31
End: 2021-04-26

## 2021-04-26 LAB
HSV1 IGG SERPL QL IA: >8 AI (ref 0–0.8)
HSV2 IGG SERPL QL IA: <0.2 AI (ref 0–0.8)

## 2021-05-03 ENCOUNTER — MYC REFILL (OUTPATIENT)
Dept: FAMILY MEDICINE | Facility: CLINIC | Age: 31
End: 2021-05-03

## 2021-05-03 DIAGNOSIS — F90.2 ADHD (ATTENTION DEFICIT HYPERACTIVITY DISORDER), COMBINED TYPE: ICD-10-CM

## 2021-05-03 RX ORDER — DEXTROAMPHETAMINE SACCHARATE, AMPHETAMINE ASPARTATE, DEXTROAMPHETAMINE SULFATE AND AMPHETAMINE SULFATE 5; 5; 5; 5 MG/1; MG/1; MG/1; MG/1
20 TABLET ORAL 2 TIMES DAILY
Qty: 60 TABLET | Refills: 0 | Status: SHIPPED | OUTPATIENT
Start: 2021-05-03 | End: 2021-05-27

## 2021-05-26 NOTE — TELEPHONE ENCOUNTER
Controlled Substance Refill Request  Medication:   Requested Prescriptions     Pending Prescriptions Disp Refills     dextroamphetamine-amphetamine (ADDERALL) 20 mg Tab 60 tablet 0     Sig: Take 20 mg by mouth daily. May take an additional dose in the afternoon if needed.     Date Last Fill: 2/20/19  [id number]   Submit electronically to pharmacy  Controlled Substance Agreement on File:   Encounter-Level CSA Scan Date - 05/16/2017:    Scan on 5/18/2017  2:19 PM (below)         Last office visit: Last office visit pertaining to requested medication was 12/5/18.

## 2021-05-27 ENCOUNTER — OFFICE VISIT (OUTPATIENT)
Dept: FAMILY MEDICINE | Facility: CLINIC | Age: 31
End: 2021-05-27
Payer: COMMERCIAL

## 2021-05-27 VITALS
HEART RATE: 60 BPM | BODY MASS INDEX: 28.96 KG/M2 | RESPIRATION RATE: 20 BRPM | TEMPERATURE: 97.1 F | WEIGHT: 237.8 LBS | OXYGEN SATURATION: 99 % | SYSTOLIC BLOOD PRESSURE: 114 MMHG | DIASTOLIC BLOOD PRESSURE: 77 MMHG | HEIGHT: 76 IN

## 2021-05-27 DIAGNOSIS — J02.0 STREP THROAT: Primary | ICD-10-CM

## 2021-05-27 DIAGNOSIS — F90.2 ADHD (ATTENTION DEFICIT HYPERACTIVITY DISORDER), COMBINED TYPE: ICD-10-CM

## 2021-05-27 LAB
DEPRECATED S PYO AG THROAT QL EIA: POSITIVE
SPECIMEN SOURCE: ABNORMAL

## 2021-05-27 PROCEDURE — 87880 STREP A ASSAY W/OPTIC: CPT | Performed by: FAMILY MEDICINE

## 2021-05-27 PROCEDURE — 99214 OFFICE O/P EST MOD 30 MIN: CPT | Performed by: FAMILY MEDICINE

## 2021-05-27 RX ORDER — AMOXICILLIN 500 MG/1
500 CAPSULE ORAL 2 TIMES DAILY
Qty: 20 CAPSULE | Refills: 0 | Status: SHIPPED | OUTPATIENT
Start: 2021-05-27 | End: 2021-09-30

## 2021-05-27 RX ORDER — DEXTROAMPHETAMINE SACCHARATE, AMPHETAMINE ASPARTATE, DEXTROAMPHETAMINE SULFATE AND AMPHETAMINE SULFATE 5; 5; 5; 5 MG/1; MG/1; MG/1; MG/1
20 TABLET ORAL 2 TIMES DAILY
Qty: 60 TABLET | Refills: 0 | Status: SHIPPED | OUTPATIENT
Start: 2021-05-27 | End: 2021-07-07

## 2021-05-27 ASSESSMENT — ANXIETY QUESTIONNAIRES
IF YOU CHECKED OFF ANY PROBLEMS ON THIS QUESTIONNAIRE, HOW DIFFICULT HAVE THESE PROBLEMS MADE IT FOR YOU TO DO YOUR WORK, TAKE CARE OF THINGS AT HOME, OR GET ALONG WITH OTHER PEOPLE: NOT DIFFICULT AT ALL
1. FEELING NERVOUS, ANXIOUS, OR ON EDGE: SEVERAL DAYS
GAD7 TOTAL SCORE: 4
5. BEING SO RESTLESS THAT IT IS HARD TO SIT STILL: NOT AT ALL
2. NOT BEING ABLE TO STOP OR CONTROL WORRYING: SEVERAL DAYS
3. WORRYING TOO MUCH ABOUT DIFFERENT THINGS: SEVERAL DAYS
7. FEELING AFRAID AS IF SOMETHING AWFUL MIGHT HAPPEN: NOT AT ALL
6. BECOMING EASILY ANNOYED OR IRRITABLE: SEVERAL DAYS

## 2021-05-27 ASSESSMENT — MIFFLIN-ST. JEOR: SCORE: 2140.15

## 2021-05-27 ASSESSMENT — PATIENT HEALTH QUESTIONNAIRE - PHQ9
5. POOR APPETITE OR OVEREATING: NOT AT ALL
SUM OF ALL RESPONSES TO PHQ QUESTIONS 1-9: 4
SUM OF ALL RESPONSES TO PHQ QUESTIONS 1-9: 1

## 2021-05-27 NOTE — PROGRESS NOTES
Assessment & Plan     Strep throat  We checked a rapid strep test which was positive.  He was given a prescription for amoxicillin.  He may take Tylenol or ibuprofen as needed.  If symptoms do not improve he will return to clinic.  - Streptococcus A Rapid Scr w Reflx to PCR  - amoxicillin (AMOXIL) 500 MG capsule; Take 1 capsule (500 mg) by mouth 2 times daily    ADHD (attention deficit hyperactivity disorder), combined type  ADHD symptoms continue to be well controlled on Adderall 20 mg twice daily without unwanted side effects.  He was given a refill today.  He may continue to get refills of the next 6 months.  - amphetamine-dextroamphetamine (ADDERALL) 20 MG tablet; Take 1 tablet (20 mg) by mouth 2 times daily                 Return in about 2 weeks (around 6/10/2021) for Follow up if symptoms not improving..    Guru Augustine, [facility]    Anamaria Sales is a 30 year old who presents for the following health issues     HPI     Concern - Pain with Swallowing  Onset: x1 week  Description: pain on right side of throat/neck when swallowing   Intensity: mild  Progression of Symptoms:  waxing and waning  Accompanying Signs & Symptoms: back of throat slightly irritated   Previous history of similar problem: was exposed to someone who had strep; went to [facility] on Sunday 5/23/21 and had negative strep test   Precipitating factors:        Worsened by: sneezing, coughing  Alleviating factors:        Improved by: none  Therapies tried and outcome: lidocaine solution, ibuprofen - not helpful       He denies feeling ill.  He is not having fevers.  He denies having any nasal, throat, sinus or chest congestion.  He is not coughing or short of breath.  He is not having difficulty eating or drinking.  His voice feels little hoarse.    He has a history of ADHD, combined type.  He reports the symptoms have been stable and well-controlled on Adderall 20 mg twice daily.  He denies  "unwanted side effects.    Review of Systems         Objective    /77 (Patient Position: Sitting, Cuff Size: Adult Large)   Pulse 60   Temp 97.1  F (36.2  C) (Tympanic)   Resp 20   Ht 1.93 m (6' 4\")   Wt 107.9 kg (237 lb 12.8 oz)   SpO2 99%   BMI 28.95 kg/m    Body mass index is 28.95 kg/m .  Physical Exam   GENERAL: healthy, alert and no distress  EYES: Eyes grossly normal to inspection, PERRL and conjunctivae and sclerae normal  HENT: ear canals and TM's normal, nose and mouth without ulcers or lesions.  Posterior oropharynx is slightly erythematous  NECK: no adenopathy, no asymmetry, masses, or scars and thyroid normal to palpation  RESP: lungs clear to auscultation - no rales, rhonchi or wheezes  CV: regular rate and rhythm, normal S1 S2, no S3 or S4, no murmur, click or rub, no peripheral edema and peripheral pulses strong  ABDOMEN: soft, nontender, no hepatosplenomegaly, no masses and bowel sounds normal  MS: no gross musculoskeletal defects noted, no edema  SKIN: no suspicious lesions or rashes  NEURO: Normal strength and tone, mentation intact and speech normal  PSYCH: mentation appears normal, affect normal/bright                "

## 2021-05-28 ASSESSMENT — ANXIETY QUESTIONNAIRES
GAD7 TOTAL SCORE: 4
GAD7 TOTAL SCORE: 2

## 2021-05-28 NOTE — TELEPHONE ENCOUNTER
Controlled Substance Refill Request  Medication:   Requested Prescriptions     Pending Prescriptions Disp Refills     dextroamphetamine-amphetamine (ADDERALL) 20 mg Tab 60 tablet 0     Sig: Take 20 mg by mouth daily. May take an additional dose in the afternoon if needed.     Date Last Fill: 3/25/19  [id number]   Submit electronically to pharmacy  Controlled Substance Agreement on File:   Encounter-Level CSA Scan Date - 05/16/2017:    Scan on 5/18/2017  2:19 PM (below)         Last office visit: Last office visit pertaining to requested medication was 12/5/18.

## 2021-05-28 NOTE — TELEPHONE ENCOUNTER
Controlled Substance Refill Request  Medication:   Requested Prescriptions     Pending Prescriptions Disp Refills     dextroamphetamine-amphetamine (ADDERALL) 20 mg Tab 60 tablet 0     Sig: Take 20 mg by mouth daily. May take an additional dose in the afternoon if needed.     Date Last Fill: 4/22/19  #60 R-0  [id number]   Submit electronically to pharmacy  Controlled Substance Agreement on File:   Encounter-Level CSA Scan Date - 05/16/2017:    Scan on 5/18/2017  2:19 PM (below)         Last office visit: 12/5/2018 Guru Vásquez, DO Deonna Tracey RN Triage Nurse Advisor [facility]

## 2021-05-29 NOTE — PROGRESS NOTES
Clinic Note   SUBJECTIVE:   Chief Complaint   Patient presents with     Test   Henrry Gilliam states that he needs to have treatment for exposure to GC.  His current girlfriend advised him that she came up positive for this infection.  He has had this 1 partner for over one year.  He has had no symptoms.  He denies dysuria, discharge, rash, fever, malaise, groin pain, testicular pain.  He is also had no sore throat, cough.  Review of systems as in HPI.  No Known Allergies  OBJECTIVE:   /79 (Patient Site: Left Arm, Patient Position: Sitting, Cuff Size: Adult Large)   Pulse 67   Wt (!) 231 lb 4.8 oz (104.9 kg)   SpO2 99%   BMI 29.30 kg/m    HEENT: Bilateral ear exam showed no inflammation of tympanic membranes or   canals. Nose exam: No discharge. Sinuses nontender on palpation. Oral   exam: The patient had moderate erythema, inflammation of oropharynx.   NECK: Supple, mildly tender and bilateral anterior cervical lymphadenopathy.   LUNGS: Clear to auscultation.   CARDIOVASCULAR: S1, S2, regular rate and rhythm, no murmur.   Abdomen: BS in 4 quadrants, no tenderness to light or deep palpation, liver and spleen are not palpably enlarged and there are no masses noted.   ASSESSMENT:  1. STI (sexually transmitted infection)  Chlamydia trachomatis & Neisseria gonorrhoeae, Amplified Detection    cefTRIAXone injection 250 mg (ROCEPHIN)    azithromycin (ZITHROMAX) 500 MG tablet     PLAN:   Signs and symptoms of STIs, explained that sometimes there are few symptoms, usually for a male there is some urinary pain or discharge.  He has had exposure though so we will give him Rocephin 250 mg IM and azithromycin.  Advised that if his urine test comes up positive that he will be contacted by the [facility] for additional information on other partners.  He voices understanding.  Josy Golden, MS, PA-C

## 2021-05-29 NOTE — TELEPHONE ENCOUNTER
Controlled Substance Refill Request  Medication:   Requested Prescriptions     Pending Prescriptions Disp Refills     dextroamphetamine-amphetamine (ADDERALL) 20 mg Tab 60 tablet 0     Sig: Take 20 mg by mouth daily. May take an additional dose in the afternoon if needed.     Date Last Fill: 5/19/19  [facility]   Submit electronically to pharmacy    Controlled Substance Agreement on File:   Encounter-Level CSA Scan Date - 05/16/2017:    Scan on 5/18/2017  2:19 PM (below)         Last office visit with primary: 12/5/2018

## 2021-05-29 NOTE — PROGRESS NOTES
GC/Chl test is negative and  normal, please call results to the patient or send a letter if not reachable by phone.

## 2021-05-30 VITALS — BODY MASS INDEX: 25.88 KG/M2 | HEIGHT: 75 IN | WEIGHT: 208.19 LBS

## 2021-05-30 VITALS — HEIGHT: 75 IN | BODY MASS INDEX: 26.55 KG/M2 | WEIGHT: 213.56 LBS

## 2021-05-30 VITALS — HEIGHT: 75 IN | WEIGHT: 211.5 LBS | BODY MASS INDEX: 26.3 KG/M2

## 2021-05-30 NOTE — TELEPHONE ENCOUNTER
Controlled Substance Refill Request  Medication:   Requested Prescriptions     Pending Prescriptions Disp Refills     dextroamphetamine-amphetamine (ADDERALL) 20 mg Tab 60 tablet 0     Sig: Take 20 mg by mouth daily. May take an additional dose in the afternoon if needed.     Date Last Fill: 6/24/19  [id number]   Submit electronically to pharmacy  Controlled Substance Agreement on File:   Encounter-Level CSA Scan Date - 05/16/2017:    Scan on 5/18/2017  2:19 PM (below)         Last office visit: Last office visit pertaining to requested medication was 5/31/19.

## 2021-05-31 VITALS — BODY MASS INDEX: 27.88 KG/M2 | HEIGHT: 75 IN | WEIGHT: 224.25 LBS

## 2021-05-31 VITALS — BODY MASS INDEX: 26.37 KG/M2 | HEIGHT: 75 IN | WEIGHT: 212.06 LBS

## 2021-05-31 VITALS — BODY MASS INDEX: 28.12 KG/M2 | WEIGHT: 222 LBS

## 2021-05-31 VITALS — HEIGHT: 75 IN | BODY MASS INDEX: 27.14 KG/M2 | WEIGHT: 218.31 LBS

## 2021-05-31 VITALS — BODY MASS INDEX: 27.65 KG/M2 | WEIGHT: 222.4 LBS | HEIGHT: 75 IN

## 2021-05-31 NOTE — TELEPHONE ENCOUNTER
Controlled Substance Refill Request  Medication:   Requested Prescriptions     Pending Prescriptions Disp Refills     dextroamphetamine-amphetamine (ADDERALL) 20 mg Tab 60 tablet 0     Sig: Take 20 mg by mouth daily. May take an additional dose in the afternoon if needed.       Date Last Fill: 7/17/19    Pharmacy: [facility]        Submit electronically to pharmacy      Controlled Substance Agreement on File:   Encounter-Level CSA Scan Date - 05/16/2017:    Scan on 5/18/2017  2:19 PM (below)             Last office visit: 12/5/2018 Guru Vásquez, DO

## 2021-05-31 NOTE — PROGRESS NOTES
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     Plan:     Medications Ordered   Medications     doxycycline (VIBRA-TABS) 100 MG tablet     Sig: Take 1 tablet (100 mg total) by mouth 2 (two) times a day for 7 days.     Dispense:  14 tablet     Refill:  0     There are no Patient Instructions on file for this visit.  No follow-ups on file.    Subjective:   Henrry Gilliam is a 29 y.o. male who submitted an eVisit request for evaluation of his No chief complaint on file..  See the questionnaire and message section of encounter report for information related to history of present illness and review of systems.    The following portions of the patient's history were reviewed and updated as appropriate:  He does not have any pertinent problems on file.  He has No Known Allergies..     Objective:   No exam performed today, patient submitted as eVisit.    
Principal Discharge DX:	Fracture

## 2021-05-31 NOTE — PATIENT INSTRUCTIONS - HE
Based on the information that you have provided, I have placed an order for you to start treatment.  View your full visit summary for details. Click on the link below to access your visit summary.    Your pharmacist will address any questions you may have about taking the medication.    I sent an antibiotic for doxycycline twice daily x 7 days to your pharmacy.

## 2021-06-01 VITALS — BODY MASS INDEX: 27.14 KG/M2 | WEIGHT: 218.31 LBS | HEIGHT: 75 IN

## 2021-06-01 NOTE — TELEPHONE ENCOUNTER
Controlled Substance Refill Request  Medication:   Requested Prescriptions     Pending Prescriptions Disp Refills     dextroamphetamine-amphetamine (ADDERALL) 20 mg Tab 60 tablet 0     Sig: Take 20 mg by mouth daily. May take an additional dose in the afternoon if needed.     Date Last Fill: 8/19/9  [id number]   Submit electronically to pharmacy  Controlled Substance Agreement on File:   Encounter-Level CSA Scan Date - 05/16/2017:    Scan on 5/18/2017  2:19 PM (below)         Last office visit: Last office visit pertaining to requested medication was 5/31/19.

## 2021-06-02 VITALS — BODY MASS INDEX: 29.32 KG/M2 | HEIGHT: 75 IN | WEIGHT: 235.8 LBS

## 2021-06-02 VITALS — WEIGHT: 231.3 LBS | BODY MASS INDEX: 29.3 KG/M2

## 2021-06-02 NOTE — TELEPHONE ENCOUNTER
Controlled Substance Refill Request  Medication:   Requested Prescriptions     Pending Prescriptions Disp Refills     dextroamphetamine-amphetamine (ADDERALL) 20 mg Tab 60 tablet 0     Sig: Take 20 mg by mouth daily. May take an additional dose in the afternoon if needed.     Date Last Fill: 9/16/19  [id number]   Submit electronically to pharmacy  Controlled Substance Agreement on File:   Encounter-Level CSA Scan Date - 05/16/2017:    Scan on 5/18/2017  2:19 PM       Last office visit: Last office visit pertaining to requested medication was 5/31/19.

## 2021-06-03 VITALS
HEIGHT: 75 IN | WEIGHT: 238.38 LBS | SYSTOLIC BLOOD PRESSURE: 102 MMHG | RESPIRATION RATE: 16 BRPM | DIASTOLIC BLOOD PRESSURE: 70 MMHG | BODY MASS INDEX: 29.64 KG/M2 | HEART RATE: 60 BPM | TEMPERATURE: 97.5 F

## 2021-06-03 NOTE — TELEPHONE ENCOUNTER
Controlled Substance Refill Request  Medication:   Requested Prescriptions     Pending Prescriptions Disp Refills     dextroamphetamine-amphetamine (ADDERALL) 20 mg Tab 60 tablet 0     Sig: Take 20 mg by mouth daily. May take an additional dose in the afternoon if needed.     Date Last Fill: 10/15/19  [id number]   Submit electronically to pharmacy  Controlled Substance Agreement on File:   Encounter-Level CSA Scan Date - 05/16/2017:    Scan on 5/18/2017  2:19 PM       Last office visit: 12/5/2018 Guru Vásquez, DO Silva Roland RN BS [facility] Triage/Med Refill 11/11/2019 12:35 PM

## 2021-06-04 VITALS
WEIGHT: 242 LBS | DIASTOLIC BLOOD PRESSURE: 80 MMHG | BODY MASS INDEX: 30.09 KG/M2 | HEIGHT: 75 IN | HEART RATE: 56 BPM | SYSTOLIC BLOOD PRESSURE: 118 MMHG | RESPIRATION RATE: 16 BRPM

## 2021-06-04 VITALS
HEART RATE: 68 BPM | SYSTOLIC BLOOD PRESSURE: 116 MMHG | TEMPERATURE: 98.3 F | BODY MASS INDEX: 29.47 KG/M2 | WEIGHT: 237 LBS | HEIGHT: 75 IN | DIASTOLIC BLOOD PRESSURE: 64 MMHG | OXYGEN SATURATION: 98 %

## 2021-06-04 NOTE — TELEPHONE ENCOUNTER
Please contact this patient and ask him to schedule follow-up appointment.  I have not seen him in over a year.  I can refill the Adderall, but he needs to be seen for follow-up. -DE

## 2021-06-04 NOTE — TELEPHONE ENCOUNTER
Controlled Substance Refill Request  Medication:   Requested Prescriptions     Pending Prescriptions Disp Refills     dextroamphetamine-amphetamine (ADDERALL) 20 mg Tab 60 tablet 0     Sig: Take 20 mg by mouth daily. May take an additional dose in the afternoon if needed.     Date Last Fill: 11/11/19  [id number]   Submit electronically to pharmacy  Controlled Substance Agreement on File:   Encounter-Level CSA Scan Date - 05/16/2017:    Scan on 5/18/2017  2:19 PM       Last office visit: 12/5/2018 Guru Vásquez, DO Silva Roland RN BSBA [facility] Triage/Med Refill 12/10/2019 6:05 AM

## 2021-06-05 NOTE — TELEPHONE ENCOUNTER
Controlled Substance Refill Request  Medication Name:   Requested Prescriptions     Pending Prescriptions Disp Refills     dextroamphetamine-amphetamine (ADDERALL) 20 mg Tab 60 tablet 0     Sig: Take 20 mg by mouth daily. May take an additional dose in the afternoon if needed.     Date Last Fill: 1/7/20  [facility]  Submit electronically to pharmacy  Controlled Substance Agreement on file:   Encounter-Level CSA Scan Date - 05/16/2017:    Scan on 5/18/2017  2:19 PM        Last office visit:  1/7/20

## 2021-06-05 NOTE — TELEPHONE ENCOUNTER
Left message to call back for: Henrry  Information to relay to patient:  Please relay information below from Dr. Costa he had sent him a [employer] message about this and no call back yet.  Thank you. I have form on my desk await lab results.    Hi Henrry,   I filled out the form, but this year they are requesting a lab that we did not check.  It is the hemoglobin A1c test that tells us what your glucose control has been like over the past couple of months.  This was not a test that was requested the last time you had biometric screening done.  I will put an order in and you may come to the clinic to have this lab checked at your convenience.   Sincerely,   Dr. Meliza Augustine, DO

## 2021-06-06 NOTE — TELEPHONE ENCOUNTER
Controlled Substance Refill Request  Medication Name:   Requested Prescriptions     Pending Prescriptions Disp Refills     dextroamphetamine-amphetamine (ADDERALL) 20 mg Tab 60 tablet 0     Sig: Take 20 mg by mouth daily. May take an additional dose in the afternoon if needed.     Date Last Fill: 2/5/20  [facility]  Submit electronically to pharmacy  Controlled Substance Agreement on file:   Encounter-Level CSA Scan Date - 05/16/2017:    Scan on 5/18/2017  2:19 PM        Last office visit:  1/7/20

## 2021-06-06 NOTE — TELEPHONE ENCOUNTER
Who is calling:  Patient   Reason for Call:  Checking the status of message below  Date of last appointment with primary care: 01/07/20  Okay to leave a detailed message: Yes

## 2021-06-06 NOTE — TELEPHONE ENCOUNTER
FYI - Status Update  Who is Calling: Patient  Update: Patient stated he is out of his Adderall Rx and would like this sent in today.  Okay to leave a detailed message?:  No

## 2021-06-06 NOTE — TELEPHONE ENCOUNTER
Controlled Substance Refill Request  Medication Name:   Requested Prescriptions     Pending Prescriptions Disp Refills     dextroamphetamine-amphetamine (ADDERALL) 20 mg Tab 60 tablet 0     Sig: Take 20 mg by mouth daily. May take an additional dose in the afternoon if needed.     Date Last Fill: 8/7/19  [facility]  Submit electronically to pharmacy  Controlled Substance Agreement on file:   Encounter-Level CSA Scan Date - 05/16/2017:    Scan on 5/18/2017  2:19 PM        Last office visit:  1/7/20

## 2021-06-07 NOTE — TELEPHONE ENCOUNTER
Who is calling:  Patient   Reason for Call:  Patient stated that the pharmacy did not get his prescription so he calling to check status of prescription. Patient stated that it is not supposed to go to [facility]. Patient stated to send to [id number]. Writer linked corrected pharmacy below.  Date of last appointment with primary care: n/a  Okay to leave a detailed message: Yes  [phone]

## 2021-06-07 NOTE — TELEPHONE ENCOUNTER
Controlled Substance Refill Request  Medication Name:   Requested Prescriptions     Pending Prescriptions Disp Refills     dextroamphetamine-amphetamine (ADDERALL) 20 mg Tab 60 tablet 0     Sig: Take 20 mg by mouth daily. May take an additional dose in the afternoon if needed.     Date Last Fill: 3/6/20  [facility]  Submit electronically to pharmacy  Controlled Substance Agreement on file:   Encounter-Level CSA Scan Date - 05/16/2017:    Scan on 5/18/2017  2:19 PM        Last office visit:  1/7/20

## 2021-06-08 NOTE — PROGRESS NOTES
Assessment / Impression     1. Swelling of right knee joint     2. Migraine with aura and without status migrainosus, not intractable     3. S/P ACL reconstruction           Plan:     I recommended RICE treatment for the knee.  He is planning to take at least a week off playing basketball to allow things to heal.  The ligaments.  The intact on exam.  He continues to have swelling he is planning to follow-up with his orthopedic specialist at [facility].  We discussed strategies that may help prevent the migraine headaches.  I encouraged him to stay well-hydrated while he is playing basketball which seem to be helpful during his last game.  He has ibuprofen available as needed.  He was given a prescription for sumatriptan to be used as needed.  He will be no if the migraines do not improve.    Subjective:      HPI: Henrry Gilliam is a 26 y.o. male who presents to the clinic to discuss a couple of concerns.  He describes having increase right knee swelling after playing basketball couple of nights ago.  He denies any specific injury during that game.  He was able to play without any difficulty.  The swelling started after beginning it became worse the following day.  He reports that the knee feels stiff.  It is also feeling a little unstable today.  He has a history of 2 previous ACL tears and reconstruction surgeries in 2012 and 2014.  He states that there was a meniscus damage during these injuries as well.    He is also concerned about 3 recent migraine headaches that he had after playing basketball.  He describes having a history of migraines which have not bothered him for quite some time.  After 3 recent basketball games he noticed aura and developed significant migraine headache symptoms on the sides of his head.  He denies nausea or vomiting.  He describes having photophobia and phonophobia symptoms.  He tries to find a dark room to sleep it off when headaches occur.  He also usually takes 800 mg of ibuprofen when  "the headaches start.  He tries to stay well-hydrated during the day, but admits that he does not typically drink water during basketball games.  During his last game he decided to drink water and he did not have a headache afterwards.        Review of Systems  All other systems reviewed and are negative.     History   Smoking Status     Former Smoker   Smokeless Tobacco     Former User     Comment: once a month.       Family History   Problem Relation Age of Onset     Diabetes Maternal Grandmother      Diabetes Paternal Grandmother      Diabetes Paternal Grandfather      Migraines Mother        Objective:     Visit Vitals     /74 (Patient Site: Left Arm, Patient Position: Sitting, Cuff Size: Adult Regular)     Pulse (!) 56     Temp 98.3  F (36.8  C) (Oral)     Resp 16     Ht 6' 2.5\" (1.892 m)     Wt 213 lb 9 oz (96.9 kg)     BMI 27.05 kg/m2     Physical Examination: General appearance - alert, well appearing, and in no distress  Eyes: pupils equal and reactive, extraocular eye movements intact  Ears: Tympanic membranes clear  Mouth: mucous membranes moist, pharynx normal without lesions  Neck: supple, no significant adenopathy  Lungs: clear to auscultation, no wheezes, rales or rhonchi, symmetric air entry  Heart: normal rate, regular rhythm, normal S1, S2, no murmurs, rubs, clicks or gallops  Neurological: alert, oriented, normal speech, no focal findings or movement disorder noted.    Extremities: There is a moderate effusion of the right knee.  Mallika's testing negative bilaterally.  Varus, valgus, Lachman's and drawer testing negative bilaterally.      No results found for this or any previous visit (from the past 168 hour(s)).    Current Outpatient Prescriptions   Medication Sig     SUMAtriptan (IMITREX) 50 MG tablet Take 1-2 tablets ( mg total) by mouth once as needed for migraine.       "

## 2021-06-08 NOTE — TELEPHONE ENCOUNTER
Controlled Substance Refill Request  Medication Name:   Requested Prescriptions     Pending Prescriptions Disp Refills     dextroamphetamine-amphetamine (ADDERALL) 20 mg Tab 60 tablet 0     Sig: Take 20 mg by mouth daily. May take an additional dose in the afternoon if needed.     Date Last Fill: 5/6/20  [facility]  Submit electronically to pharmacy  Controlled Substance Agreement on file:   Encounter-Level CSA Scan Date - 05/16/2017:    Scan on 5/18/2017  2:19 PM        Last office visit:  1/7/20

## 2021-06-08 NOTE — TELEPHONE ENCOUNTER
Controlled Substance Refill Request  Medication Name:   Requested Prescriptions     Pending Prescriptions Disp Refills     dextroamphetamine-amphetamine (ADDERALL) 20 mg Tab 60 tablet 0     Sig: Take 20 mg by mouth daily. May take an additional dose in the afternoon if needed.     Date Last Fill: 4/6/20  Requested Pharmacy: [facility]  Submit electronically to pharmacy  Controlled Substance Agreement on file:   Encounter-Level CSA Scan Date - 05/16/2017:    Scan on 5/18/2017  2:19 PM        Last office visit:  1/7/20

## 2021-06-09 NOTE — TELEPHONE ENCOUNTER
Controlled Substance Refill Request  Medication Name:   Requested Prescriptions     Pending Prescriptions Disp Refills     dextroamphetamine-amphetamine (ADDERALL) 20 mg Tab 60 tablet 0     Sig: Take 20 mg by mouth daily. May take an additional dose in the afternoon if needed.     Date Last Fill: 6/3/2020  Is patient out of medication?:  Yes  Patient notified refills processed within 3 business days:  Yes  [facility]ed [facility]  Submit electronically to pharmacy  Controlled Substance Agreement on file:   Encounter-Level CSA Scan Date - 05/16/2017:    Scan on 5/18/2017  2:19 PM        Last office visit:  1/7/2020

## 2021-06-09 NOTE — PROGRESS NOTES
Assessment / Impression     1. ADHD (attention deficit hyperactivity disorder)  Ambulatory referral to Psychology   2. History of chlamydia infection  Chlamydia trachomatis & Neisseria gonorrhoeae, Amplified Detection   3. Well adult exam  Glucose    Lipid Cascade         Plan:     I recommended he have a formal ADHD evaluation with psychology.  He is agreeable with this plan and will return to clinic after the assessment has been done.  We are going to recheck gonorrhea and chlamydia labs today.  He finished treatment for chlamydia infection in December.  He reports that his partner was also treated.  He is also fasting today was like to have his fasting cholesterol and glucose checked.    Subjective:      HPI: Henrry Gilliam is a 26 y.o. male who presents to the clinic to discuss difficulty he has had with focus, task completion and distractibility.  He states that he was diagnosed with ADHD as a young kid approximately age 5.  He was started on Ritalin which was then switched to Adderall.  He reports that the Ritalin caused heart racing symptoms.  He describes doing well on Adderall through high school.  He also took this for 2 years in college.  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  He has noticed that his poor focus and difficulty with task completion and distractibility have affected his work performance.  Over the past 2 years he has been trying to drink more coffee to compensate for this.  He's also noticed some difficulty with these issues at home and when studying for the GMAT exam.    Last December he had a positive Chlamydia test and was treated with azithromycin.  He reports that his partner was treated as well.  He would like to be screened for this event today.  He denies having symptoms of a sexual transmitted infection at this time.        Review of Systems  All other systems reviewed and are negative.     History   Smoking Status      "Former Smoker   Smokeless Tobacco     Former User     Comment: once a month.       Family History   Problem Relation Age of Onset     Diabetes Maternal Grandmother      Diabetes Paternal Grandmother      Diabetes Paternal Grandfather      Migraines Mother        Objective:     Visit Vitals     /70 (Patient Site: Left Arm, Patient Position: Sitting, Cuff Size: Adult Large)     Pulse (!) 52     Temp 97.8  F (36.6  C) (Oral)     Resp 16     Ht 6' 2.5\" (1.892 m)     Wt 208 lb 3 oz (94.4 kg)     BMI 26.37 kg/m2     Physical Examination: General appearance - alert, well appearing, and in no distress  Eyes: pupils equal and reactive, extraocular eye movements intact  Mouth: mucous membranes moist, pharynx normal without lesions  Neck: supple, no significant adenopathy  Lungs: clear to auscultation, no wheezes, rales or rhonchi, symmetric air entry  Heart: normal rate, regular rhythm, normal S1, S2, no murmurs, rubs, clicks or gallops  Neurological: alert, oriented, normal speech, no focal findings or movement disorder noted.    Extremities: No edema, no clubbing or cyanosis  Psychiatric: Normal affect. Does not appear anxious or depressed.    Recent Results (from the past 168 hour(s))   Glucose   Result Value Ref Range    Glucose 108 (H) 70 - 99 mg/dL    Patient Fasting > 8hrs? Yes        Current Outpatient Prescriptions   Medication Sig     SUMAtriptan (IMITREX) 50 MG tablet Take 1-2 tablets ( mg total) by mouth once as needed for migraine.       "

## 2021-06-10 NOTE — PROGRESS NOTES
Assessment / Impression     1. Sore throat  Rapid Strep A Screen-Throat    Group A Strep, RNA Direct Detection, Throat   2. Screen for STD (sexually transmitted disease)  Chlamydia trachomatis & Neisseria gonorrhoeae, Amplified Detection   3. History of chlamydia infection     4. ADHD (attention deficit hyperactivity disorder)           Plan:     I explained that his sore throat symptoms appear to be due to a viral upper respiratory infection.  I recommended symptomatic cares and stressed the importance of rest and maintaining hydration.  He may continue over-the-counter treatments if he chooses.  We are going to check gonorrhea and chlamydia labs today.  We discussed his ADHD symptoms and decided to restart Adderall which he did well on in the past.  He was given a prescription for Adderall XR 20 mg daily.  I asked him to follow-up with me in 3 months to see how things are going.  I am also going to touch base with our specialty  to verify that his insurance does not provide coverage for ADHD evaluations through a psychologist.    Subjective:      HPI: Henrry Gilliam is a 26 y.o. male who presents to the clinic to be evaluated for productive cough with a sore throat, fatigue and sinus congestion with pressure and discomfort over the past 4 days.  He denies having a fever, but he has felt warm.  He describes having mucus in his throat.  He is not short of breath or wheezing.  He has tried NyQuil which seems to help.    He was diagnosed with ADHD as a young kid, approximately age 5. He was started on Ritalin which was then switched to Adderall. He reports that the Ritalin caused heart racing symptoms. He was switched to Adderall and did well through high school and college.  He has been off this medication for the past 3-4 years.  He is hoping to restart it.  A couple of months ago he was given a referral to have a formal ADHD evaluation, but he reports that this was not covered by his insurance and it was  "going to cost close to $900 which he cannot afford.  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 He has noticed that his poor focus and difficulty with task completion and distractibility have affected his work performance.  He has noticed the symptoms at work and at home and when studying for the GMAT exam.  Over the past 2 years he has been trying to drink more coffee to compensate for this.      He is requesting to have STD screening labs done today, specifically gonorrhea and chlamydia.  Denies having any symptoms penile discharge.  Last December he had a positive Chlamydia test and was treated with azithromycin. He reports that his partner was treated as well.  He had a negative gonorrhea/chlamydia test done on 3/21/17.          Review of Systems  All other systems reviewed and are negative.     History   Smoking Status     Former Smoker   Smokeless Tobacco     Former User     Comment: once a month.       Family History   Problem Relation Age of Onset     Diabetes Maternal Grandmother      Diabetes Paternal Grandmother      Diabetes Paternal Grandfather      Migraines Mother        Objective:     /82 (Patient Site: Right Arm, Patient Position: Sitting, Cuff Size: Adult Large)  Pulse 80  Temp 98.4  F (36.9  C) (Oral)   Resp 16  Ht 6' 2.5\" (1.892 m)  Wt 211 lb 8 oz (95.9 kg)  BMI 26.79 kg/m2  Physical Examination: General appearance -appears mildly fatigued, and in no distress.  Sinuses mildly tender to palpation  Eyes: pupils equal and reactive, extraocular eye movements intact  Mouth: mucous membranes moist, pharynx normal without lesions  Nose: Congested  Neck: supple, no significant adenopathy  Lungs: clear to auscultation, no wheezes, rales or rhonchi, symmetric air entry  Heart: normal rate, regular rhythm, normal S1, S2, no murmurs, rubs, clicks or gallops  Neurological: alert, oriented, normal speech, no focal findings or movement " disorder noted.    Extremities: No edema, no clubbing or cyanosis  Psychiatric: Normal affect. Does not appear anxious or depressed.    Recent Results (from the past 168 hour(s))   Rapid Strep A Screen-Throat   Result Value Ref Range    Rapid Strep A Antigen No Group A Strep detected, presumptive negative No Group A Strep detected, presumptive negative       Current Outpatient Prescriptions   Medication Sig     SUMAtriptan (IMITREX) 50 MG tablet Take 1-2 tablets ( mg total) by mouth once as needed for migraine.     dextroamphetamine-amphetamine (ADDERALL XR) 20 MG 24 hr capsule Take 1 capsule (20 mg total) by mouth every morning.

## 2021-06-10 NOTE — TELEPHONE ENCOUNTER
Controlled Substance Refill Request  Medication Name:   Requested Prescriptions     Pending Prescriptions Disp Refills     dextroamphetamine-amphetamine (ADDERALL) 20 mg Tab 60 tablet 0     Sig: Take 20 mg by mouth daily. May take an additional dose in the afternoon if needed.     Date Last Fill: 7/8/20  [facility]ed [facility]  Submit electronically to pharmacy  Controlled Substance Agreement on file:   Encounter-Level CSA Scan Date - 05/16/2017:    Scan on 5/18/2017  2:19 PM        Last office visit:  1/7/20

## 2021-06-11 NOTE — TELEPHONE ENCOUNTER
Controlled Substance Refill Request  Medication Name:   Requested Prescriptions     Pending Prescriptions Disp Refills     dextroamphetamine-amphetamine (ADDERALL) 20 mg Tab 60 tablet 0     Sig: Take 20 mg by mouth daily. May take an additional dose in the afternoon if needed.     Date Last Fill: 8/3/20  [facility]ed [facility]  Submit electronically to pharmacy  Controlled Substance Agreement on file:   Encounter-Level CSA Scan Date - 05/16/2017:    Scan on 5/18/2017  2:19 PM        Last office visit:  1/7/20

## 2021-06-12 NOTE — TELEPHONE ENCOUNTER
Controlled Substance Refill Request  Medication Name:   Requested Prescriptions     Pending Prescriptions Disp Refills     dextroamphetamine-amphetamine (ADDERALL) 20 mg Tab 60 tablet 0     Sig: Take 20 mg by mouth daily. May take an additional dose in the afternoon if needed.     Date Last Fill: 10/5/20  Requested [facility]  Submit electronically to pharmacy  Controlled Substance Agreement on file:   Encounter-Level CSA Scan Date - 05/16/2017:    Scan on 5/18/2017  2:19 PM        Last office visit:  10/8/20

## 2021-06-12 NOTE — PROGRESS NOTES
Assessment / Impression     1. Attention deficit hyperactivity disorder (ADHD), combined type     2. Moderate episode of recurrent major depressive disorder (H)     3. Anxiety     4. Skin lesion  Ambulatory referral to Dermatology       The following are part of a depression follow up plan for the patient:  mental health care assessment    Plan:     He appears to be doing well on his current medication.  He is tolerating the Adderall without unwanted side effects.  He may continue to get refills over the next 6 months.  I filled out biometric screening forms for him based off his physical from January.  We decided not to repeat a physical exam today since he had one within this calendar year.  He was given referral to dermatology to have the skin lesion/scarring on both temples evaluated further.  His anxiety depression symptoms are stable without medication.    Subjective:      HPI: Henrry Gilliam is a 30 y.o. male who presents to the clinic for follow-up.  He has a history significant for ADHD, combined type, depression and anxiety.  He he feels like these medical issues are stable.  He is currently on Adderall 20 mg 1-2 times daily which he tolerates well.  He denies unwanted side effects.  This medication continues to help with focus, task completion and distractibility.  He also has a history of anxiety and depression which have been stable since he weaned off sertraline year.  He has a skin issue that he would like to see dermatology for.  He has some rough patches of skin on both temples with what appears to be some dimpling of the skin.  He tore his left Achilles tendon on 3/11/2020 and underwent surgical repair on 3/17/2020.  He feels like this has been healing well.        Review of Systems  All other systems reviewed and are negative.     Social History     Tobacco Use   Smoking Status Former Smoker   Smokeless Tobacco Former User   Tobacco Comment    once a month.       Family History   Problem  "Relation Age of Onset     Diabetes Maternal Grandmother      Diabetes Paternal Grandmother      Diabetes Paternal Grandfather      Migraines Mother        Objective:     /64 (Patient Site: Right Arm, Patient Position: Sitting, Cuff Size: Adult Regular)   Pulse 68   Temp 98.3  F (36.8  C) (Oral)   Ht 6' 3\" (1.905 m)   Wt (!) 237 lb (107.5 kg)   SpO2 98%   BMI 29.62 kg/m    Physical Examination: General appearance - alert, well appearing, and in no distress  Eyes: pupils equal and reactive, extraocular eye movements intact  Mouth: mucous membranes moist, pharynx normal without lesions  Neck: supple, no significant adenopathy  Lungs: clear to auscultation, no wheezes, rales or rhonchi, symmetric air entry  Heart: normal rate, regular rhythm, normal S1, S2, no murmurs, rubs, clicks or gallops  Abdomen: soft, nontender, nondistended, no masses or organomegaly  Neurological: alert, oriented, normal speech, no focal findings or movement disorder noted.    Extremities: No edema, no clubbing or cyanosis  Psychiatric: Normal affect. Does not appear anxious or depressed.    No results found for this or any previous visit (from the past 168 hour(s)).    Current Outpatient Medications   Medication Sig     dextroamphetamine-amphetamine (ADDERALL) 20 mg Tab Take 20 mg by mouth daily. May take an additional dose in the afternoon if needed.     "

## 2021-06-12 NOTE — PROGRESS NOTES
"Assessment / Impression     1. ADHD (attention deficit hyperactivity disorder)           Plan:     He was given a refill of Adderall 20 mg daily.  He may take an additional dose mid day if necessary.  I encouraged him to exercise on a regular basis.  I warned her not to take this medication too late in the afternoon so that it does not affect sleep.  I asked him to schedule follow-up appointment in 3 months to see how he is doing.  He may receive refills until that time.    Subjective:      HPI: Henrry Gilliam is a 27 y.o. male who presents to the clinic for ADHD follow-up.  Last month he was restarted on Adderall 20 mg daily.  He has noticed a significant improvement with focus, concentration and distractibility.  His work performance has significantly improved.  He denies having side effects.  His appetite is good and he is not having difficulty sleeping.  He admits that the medication seems to wear off by noon and he tends to have more difficulty with focus and distractibility in the afternoons.        Review of Systems  All other systems reviewed and are negative.     History   Smoking Status     Former Smoker   Smokeless Tobacco     Former User     Comment: once a month.       Family History   Problem Relation Age of Onset     Diabetes Maternal Grandmother      Diabetes Paternal Grandmother      Diabetes Paternal Grandfather      Migraines Mother        Objective:     /80 (Patient Site: Left Arm, Patient Position: Sitting, Cuff Size: Adult Large)  Pulse 60  Temp 98.3  F (36.8  C) (Oral)   Resp 16  Ht 6' 2.5\" (1.892 m)  Wt 218 lb 5 oz (99 kg)  BMI 27.65 kg/m2  Physical Examination: General appearance - alert, well appearing, and in no distress  Neck: supple, no significant adenopathy  Lungs: clear to auscultation, no wheezes, rales or rhonchi, symmetric air entry  Heart: normal rate, regular rhythm, normal S1, S2, no murmurs, rubs, clicks or gallops  Neurological: alert, oriented, normal speech, no " focal findings or movement disorder noted.    Psychiatric: Normal affect. Does not appear anxious or depressed.    No results found for this or any previous visit (from the past 168 hour(s)).    Current Outpatient Prescriptions   Medication Sig     dextroamphetamine-amphetamine (ADDERALL) 20 mg Tab Take 20 mg by mouth daily. May take an additional dose in the afternoon if needed.     SUMAtriptan (IMITREX) 50 MG tablet Take 1-2 tablets ( mg total) by mouth once as needed for migraine.

## 2021-06-12 NOTE — TELEPHONE ENCOUNTER
Left message to call back for: Henrry  Information to relay to patient:  Please notify patient of  message and help schedule. Thank you.

## 2021-06-12 NOTE — PROGRESS NOTES
Assessment / Impression     1. ADHD (attention deficit hyperactivity disorder)     2. Routine screening for STI (sexually transmitted infection)  Chlamydia trachomatis & Neisseria gonorrhoeae, Amplified Detection    HIV Antigen/Antibody Screening [place]    Syphilis Screen, [place]         Plan:     He was given a new prescription for Adderall.  I recommended that prior authorization be started this medication is not covered by his insurance.  We are going to check the above STD screening labs.    Subjective:      HPI: Henrry Gilliam is a 27 y.o. male who resents to the clinic for follow-up.  He has a history of ADHD.  This is diagnosed when he was a young kid, approximately age 5. He was started on Ritalin which was then switched to Adderall. He reports that the Ritalin caused heart racing symptoms. He was switched to Adderall and did well through high school and college.  He has been off this medication for the past 3-4 years and would like to restart it.  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 He has noticed that his poor focus and difficulty with task completion and distractibility have affected his work performance.  He has noticed the symptoms at work and at home and when he was studying for the GMAT exam.  Over the past 2 years he has been trying to drink more coffee to compensate for this.  I prescribed Adderall a few months ago, but he has not started it yet due to poor insurance coverage for this medication.  He is wondering what can be done about this.    He would like to have STD screening test done today.  This past December he tested positive for chlamydia and was treated.  He is not currently having any symptoms.        Review of Systems  All other systems reviewed and are negative.     History   Smoking Status     Former Smoker   Smokeless Tobacco     Former User     Comment: once a month.       Family History   Problem Relation Age of  "Onset     Diabetes Maternal Grandmother      Diabetes Paternal Grandmother      Diabetes Paternal Grandfather      Migraines Mother        Objective:     /70 (Patient Site: Left Arm, Patient Position: Sitting, Cuff Size: Adult Large)  Pulse 64  Temp 97.6  F (36.4  C) (Oral)   Resp 16  Ht 6' 2.5\" (1.892 m)  Wt 212 lb 1 oz (96.2 kg)  BMI 26.86 kg/m2  Physical Examination: General appearance - alert, well appearing, and in no distress  Eyes: pupils equal and reactive, extraocular eye movements intact  Mouth: mucous membranes moist, pharynx normal without lesions  Neck: supple, no significant adenopathy  Lungs: clear to auscultation, no wheezes, rales or rhonchi, symmetric air entry  Heart: normal rate, regular rhythm, normal S1, S2, no murmurs, rubs, clicks or gallops  Neurological: alert, oriented, normal speech, no focal findings or movement disorder noted.    Extremities: No edema, no clubbing or cyanosis  Psychiatric: Normal affect. Does not appear anxious or depressed.    No results found for this or any previous visit (from the past 168 hour(s)).    Current Outpatient Prescriptions   Medication Sig     dextroamphetamine-amphetamine (ADDERALL) 20 mg Tab Take 20 mg by mouth daily.     SUMAtriptan (IMITREX) 50 MG tablet Take 1-2 tablets ( mg total) by mouth once as needed for migraine.       "

## 2021-06-12 NOTE — TELEPHONE ENCOUNTER
Controlled Substance Refill Request  Medication Name:   Requested Prescriptions     Pending Prescriptions Disp Refills     dextroamphetamine-amphetamine (ADDERALL) 20 mg Tab 60 tablet 0     Sig: Take 20 mg by mouth daily. May take an additional dose in the afternoon if needed.     Date Last Fill: 9/3/2020  Requested Pharmacy:  [id number], [place], [place]  Submit electronically to pharmacy  Controlled Substance Agreement on file:   Encounter-Level CSA Scan Date - 05/16/2017:    Scan on 5/18/2017  2:19 PM        Last office visit:  1/7/2020 Physical with PCP Dr DILCIA Augustine

## 2021-06-12 NOTE — TELEPHONE ENCOUNTER
Please contact this patient and help him schedule an appointment with me to follow-up regarding the Adderall prescription.  I can see him tomorrow for a virtual/video appointment if that works for him.  I refilled the Adderall, but he is overdue to be seen. Thanks, DE

## 2021-06-13 NOTE — PROGRESS NOTES
Assessment / Impression     1. Abdominal pain, epigastric  omeprazole (PRILOSEC) 20 MG capsule   2. Migraine with aura and without status migrainosus, not intractable           Plan:     We discussed possible causes of his epigastric abdominal pain symptoms which include gastritis, GERD or peptic ulcer disease.  I recommended he take omeprazole for 2 weeks.  He is going to take this twice daily for 1 week then once daily for the second week.  He may use it as needed after that.  We discussed dietary modifications that may help.  I recommended he avoid ibuprofen for the time being and only take this with food in the future.  He may take Tylenol as needed for headaches.  If symptoms do not improve he will let me know and we will consider further work-up which may include an H. pylori test in addition to other laboratory testing and possible imaging.    Subjective:      HPI: Henrry Gilliam is a 30 y.o. male who presents to the clinic to discuss abdominal pain symptoms he has been experiencing over the past week.  He describes having an upset stomach which is worse after eating.  He puts his hand in the epigastric region when explaining her symptoms are located.  He describes feeling nauseous, but he has not vomited.  He is also noticed increased burping.  He denies having fevers.  He tried Tums which did not help much.  His stools have been normal.  They are not black.  He has noticed some bright red blood similar to what he had with hemorrhoids.  He has a history of migraines and occasionally takes ibuprofen for this.  Sometimes he does this on an empty stomach.  He usually takes ibuprofen about once a week.  He has not noticed any specific food triggers over the past week.  He drinks about 1/2-1 cup of coffee during the workday.  He reports eating more chocolate/candy last week when symptoms started.  He also eats spicy foods at times.        Review of Systems  All other systems reviewed and are negative.  "    Social History     Tobacco Use   Smoking Status Former Smoker   Smokeless Tobacco Former User       Family History   Problem Relation Age of Onset     Diabetes Maternal Grandmother      Diabetes Paternal Grandmother      Diabetes Paternal Grandfather      Migraines Mother        Objective:     /80 (Patient Site: Right Arm, Patient Position: Sitting, Cuff Size: Adult Large)   Pulse (!) 56   Resp 16   Ht 6' 3\" (1.905 m)   Wt (!) 242 lb (109.8 kg)   BMI 30.25 kg/m    Physical Examination: General appearance - alert, well appearing, and in no distress  Eyes: pupils equal and reactive, extraocular eye movements intact  Neck: supple, no significant adenopathy  Lungs: clear to auscultation, no wheezes, rales or rhonchi, symmetric air entry  Heart: normal rate, regular rhythm, normal S1, S2, no murmurs, rubs, clicks or gallops  Abdomen: soft, mildly tender to palpation in the epigastric region without rebounding, guarding or rigidity.  Abdomen is nondistended, no masses or organomegaly  Neurological: alert, oriented, normal speech, no focal findings or movement disorder noted.    Extremities: No edema, no clubbing or cyanosis  Psychiatric: Normal affect. Does not appear anxious or depressed.    No results found for this or any previous visit (from the past 168 hour(s)).    Current Outpatient Medications   Medication Sig     dextroamphetamine-amphetamine (ADDERALL) 20 mg Tab Take 20 mg by mouth daily. May take an additional dose in the afternoon if needed.     omeprazole (PRILOSEC) 20 MG capsule Take 1 capsule (20 mg total) by mouth daily before breakfast.     "

## 2021-06-13 NOTE — PROGRESS NOTES
Assessment / Impression     1. Abnormal urine odor  Chlamydia trachomatis & Neisseria gonorrhoeae, Amplified Detection    Urinalysis-UC if Indicated   2. Moderate episode of recurrent major depressive disorder  Ambulatory referral to Psychology   3. Anxiety  Ambulatory referral to Psychology       The following are part of a depression follow up plan for the patient:  mental health care assessment    Plan:     I recommended that we check a urinalysis as well as gonorrhea/chlamydia labs.  He will be notified of the results when they are available.  We discussed treatment options for his anxiety depression symptoms.  He was given a referral to [facility] for counseling.  We also discussed medication options which he is interested in starting.  He was given a prescription for sertraline 50 mg daily.  We discussed potential side effects.  I asked him to return to the clinic in 1 month for follow-up.    Subjective:      HPI: Henrry Gilliam is a 27 y.o. male who presents to the clinic to discuss a couple of concerns.  He describes having an abnormal urine odor over the past week and half.  He denies dysuria, penile discharge or urinary frequency.  He denies fevers.  One day he noticed mild abdominal discomfort.  He denies flank or back pain.    He is also concerned about depression and anxiety symptoms that he has had over the past few years.  He describes increased irritability and mood swings.  He admits that he has been isolating himself and feeling nervous and on edge.  He reports that his mother and girlfriend have been concerned about this as well.  He is sleeping well.  He is thinking about looking for a new job.  He has a history of ADHD which he reports is well controlled since restarting Adderall.        Review of Systems  All other systems reviewed and are negative.     History   Smoking Status     Former Smoker   Smokeless Tobacco     Former User     Comment: once a month.       Family History  "  Problem Relation Age of Onset     Diabetes Maternal Grandmother      Diabetes Paternal Grandmother      Diabetes Paternal Grandfather      Migraines Mother        Objective:     /64 (Patient Site: Right Arm, Patient Position: Sitting, Cuff Size: Adult Large)  Pulse 60  Resp 16  Ht 6' 2.5\" (1.892 m)  Wt (!) 222 lb 6.4 oz (100.9 kg)  BMI 28.17 kg/m2  Physical Examination: General appearance - alert, well appearing, and in no distress  Eyes: pupils equal and reactive, extraocular eye movements intact  Mouth: mucous membranes moist, pharynx normal without lesions  Neck: supple, no significant adenopathy  Lungs: clear to auscultation, no wheezes, rales or rhonchi, symmetric air entry  Heart: normal rate, regular rhythm, normal S1, S2, no murmurs, rubs, clicks or gallops  Abdomen: soft, nontender, nondistended, no masses or organomegaly  Back: No CVA tenderness  Neurological: alert, oriented, normal speech, no focal findings or movement disorder noted.    Extremities: No edema, no clubbing or cyanosis  Psychiatric: Appears mildly anxious and depressed.  PHQ-9 score is 10.  He denies suicidal ideations.  RAJ-7 score is 8.    No results found for this or any previous visit (from the past 168 hour(s)).    Current Outpatient Prescriptions   Medication Sig     dextroamphetamine-amphetamine (ADDERALL) 20 mg Tab Take 20 mg by mouth daily. May take an additional dose in the afternoon if needed.     SUMAtriptan (IMITREX) 50 MG tablet Take 1-2 tablets ( mg total) by mouth once as needed for migraine.     sertraline (ZOLOFT) 50 MG tablet Take 1 tablet (50 mg total) by mouth daily.       "

## 2021-06-13 NOTE — TELEPHONE ENCOUNTER
Controlled Substance Refill Request  Medication Name:   Requested Prescriptions     Pending Prescriptions Disp Refills     dextroamphetamine-amphetamine (ADDERALL) 20 mg Tab 60 tablet 0     Sig: Take 20 mg by mouth daily. May take an additional dose in the afternoon if needed.     Date Last Fill: 11/9/20  Requested Pharmacy: [facility]  Submit electronically to pharmacy  Controlled Substance Agreement on file:   Encounter-Level CSA Scan Date - 05/16/2017:    Scan on 5/18/2017  2:19 PM        Last office visit:  11/10/20

## 2021-06-14 NOTE — TELEPHONE ENCOUNTER
Pt calling to check status on this.  States he was unaware provider had left.    [place] to [place],I stated I will check to see whop would be covering here....

## 2021-06-14 NOTE — TELEPHONE ENCOUNTER
Controlled Substance Refill Request  Medication Name:   Requested Prescriptions     Pending Prescriptions Disp Refills     dextroamphetamine-amphetamine (ADDERALL) 20 mg Tab 60 tablet 0     Sig: Take 20 mg by mouth daily. May take an additional dose in the afternoon if needed.     Date Last Fill: 12/2/20  Requested Pharmacy: [facility]  Submit electronically to pharmacy  Controlled Substance Agreement on file:   Encounter-Level CSA Scan Date - 05/16/2017:    Scan on 5/18/2017  2:19 PM        Last office visit:  11/10/20  Fartun Rodas RN, MA  [facility]    Triage Nurse Advisor

## 2021-06-14 NOTE — TELEPHONE ENCOUNTER
Refill Request: Adderall   Last filled: 12.2.2020 dips 60 no refills  Last visit: 10.8.2020  Attention deficit hyperactivity disorder (ADHD), combined type

## 2021-06-14 NOTE — TELEPHONE ENCOUNTER
Controlled Substance Refill Request  Medication Name:   Requested Prescriptions     Pending Prescriptions Disp Refills     dextroamphetamine-amphetamine (ADDERALL) 20 mg Tab 60 tablet 0     Sig: Take 20 mg by mouth daily. May take an additional dose in the afternoon if needed.     Date Last Fill: 12/2/20  Requested Pharmacy: [facility]  Submit electronically to pharmacy  Controlled Substance Agreement on file:   Encounter-Level CSA Scan Date - 05/16/2017:    Scan on 5/18/2017  2:19 PM        Last office visit:  11/10/20

## 2021-06-14 NOTE — TELEPHONE ENCOUNTER
I called pot back to see if he was able to get through to [facility], he was not-stated he was on hold for near 20 mins and then transferred to [place] that stated they could not help him.    I committed to him that we would call today with even a temporary solution     Info given to clinic manager

## 2021-06-15 NOTE — PROGRESS NOTES
Assessment:   Diagnoses of Dysuria and Exposure to STD were pertinent to this visit.     Plan:   No medications were ordered this encounter    Patient Instructions     Based on the information you provided about possible exposure to an STD, I would recommend that you either make an appointment with your primary provider to be seen the within the next several days or come to either our [place] or [facility] for further evaluation.      Return for further follow up if needed. Call [phone]) or schedule an appointment via [facility]    Subjective:   Henrry Gilliam is a 27 y.o. male who submitted an eVisit request for evaluation of his Dysuria.  See the questionnaire and message section of encounter report for information related to history of present illness and review of systems.    The following portions of the patient's history were reviewed and updated as appropriate:  He  does not have any pertinent problems on file.  He has No Known Allergies..     Objective:   No exam performed today, patient submitted as eVisit.

## 2021-06-15 NOTE — PROGRESS NOTES
Assessment:      STD exposure     Plan:      1. IM Rocephin  2. Azithromycin  3. Labs per orders  4. Follow-up as needed     Subjective:       Henrry Gilliam is a 27 y.o. male here for evaluation of STD exposure. Was contacted by sexual partner who had confirmed chlamydia infection. Patient's exposure occurred 2 days ago. Denies urethral discharge, fever, dysuria, rash, and lesions. Sexual orientation with females.    The following portions of the patient's history were reviewed and updated as appropriate: allergies, current medications and problem list.    Review of Systems  Pertinent items are noted in HPI.     Allergies  No Known Allergies     Objective:      /60  Pulse 61  Temp 98.3  F (36.8  C) (Oral)   Resp 14  Wt 222 lb (100.7 kg)  SpO2 98%  BMI 28.12 kg/m2  General appearance: alert, appears stated age, cooperative, no distress and non-toxic

## 2021-06-16 NOTE — TELEPHONE ENCOUNTER
Telephone Encounter by Thea Moreno CMA at 5/20/2019  8:03 AM     Author: Thea Moreno CMA Service: -- Author Type: Certified Medical Assistant    Filed: 5/20/2019  8:04 AM Encounter Date: 5/17/2019 Status: Signed    : Thea Moreno CMA (Certified Medical Assistant)       Medication: Adderall  Last Date Filled 4/22/19   pulled: YES    Only PCP Prescribing? : YES  Taken as prescribed from physician notes? YES    CSA in last year: NO  Random Utox in last year: Not needed  (if any of the above answer NO - schedule with PCP)     Opioids + benzodiazepines? NO  (if the above answer YES - schedule with PCP every 6 months)         All responses suggest: Scheduling with PCP for further intervention

## 2021-06-16 NOTE — PROGRESS NOTES
Assessment / Impression     1. Moderate episode of recurrent major depressive disorder     2. Anxiety     3. ADHD (attention deficit hyperactivity disorder)     4. Screen for STD (sexually transmitted disease)  Chlamydia trachomatis & Neisseria gonorrhoeae, Amplified Detection    HIV Antigen/Antibody Screening [place]    Syphilis Screen, [place]   5. Exposure to chlamydia  Chlamydia trachomatis & Neisseria gonorrhoeae, Amplified Detection    HIV Antigen/Antibody Screening [place]    Syphilis Screen, [place]     The following are part of a depression follow up plan for the patient:  mental health care assessment    Plan:     I recommended we increase the sertraline to 100 mg daily.  He agrees with the plan and will follow-up in 2-3 months.  His ADHD symptoms are currently well controlled on Adderall.  We are going to check the above STD screening labs as well.    Subjective:      HPI: Henrry Gilliam is a 27 y.o. male who presents to the clinic for follow-up.  He has a history of anxiety, depression and ADHD.  On 10/5/17 he was started on sertraline 50 mg daily.  He reports that this has helped improve his depression and anxiety symptoms, but there is still room for improvement.  Work is going well and he is hoping for a promotion soon.  Focus, concentration and distractibility have been well controlled on Adderall.  He denies side effects.  He would like to have STD screening labs rechecked.  He was exposed to chlamydia in December.  On 12/26/17 he was treated with Rocephin and azithromycin and his gonorrhea and Chlamydia testing was negative.        Review of Systems  All other systems reviewed and are negative.     History   Smoking Status     Former Smoker   Smokeless Tobacco     Former User     Comment: once a month.       Family History   Problem Relation Age of Onset     Diabetes Maternal Grandmother      Diabetes Paternal Grandmother      Diabetes Paternal Grandfather      Migraines Mother        Objective:  "    /76 (Patient Site: Left Arm, Patient Position: Sitting, Cuff Size: Adult Large)  Pulse 60  Temp 98  F (36.7  C) (Oral)   Resp 16  Ht 6' 3\" (1.905 m)  Wt (!) 224 lb 4 oz (101.7 kg)  BMI 28.03 kg/m2  Physical Examination: General appearance - alert, well appearing, and in no distress  Eyes: pupils equal and reactive, extraocular eye movements intact  Mouth: mucous membranes moist, pharynx normal without lesions  Neck: supple, no significant adenopathy  Lungs: clear to auscultation, no wheezes, rales or rhonchi, symmetric air entry  Heart: normal rate, regular rhythm, normal S1, S2, no murmurs, rubs, clicks or gallops  Neurological: alert, oriented, normal speech, no focal findings or movement disorder noted.    Extremities: No edema, no clubbing or cyanosis  Psychiatric: Normal affect. Does not appear anxious or depressed.  PHQ-9 score 6.  RAJ-7 score is 4.    No results found for this or any previous visit (from the past 168 hour(s)).    Current Outpatient Prescriptions   Medication Sig     dextroamphetamine-amphetamine (ADDERALL) 20 mg Tab Take 20 mg by mouth daily. May take an additional dose in the afternoon if needed.     SUMAtriptan (IMITREX) 50 MG tablet Take 50 mg by mouth every 2 (two) hours as needed for migraine.     sertraline (ZOLOFT) 100 MG tablet Take 1 tablet (100 mg total) by mouth daily.     SUMAtriptan (IMITREX) 50 MG tablet Take 1-2 tablets ( mg total) by mouth once as needed for migraine.       "

## 2021-06-17 NOTE — PROGRESS NOTES
MALE PREVENTATIVE EXAM    Assessment and Plan:       1. Encounter for general adult medical examination without abnormal findings  He appears to be in excellent health.  I encouraged him to keep eating healthy foods and exercise on a regular basis.    2. Screen for STD (sexually transmitted disease)  - Chlamydia trachomatis & Neisseria gonorrhoeae (under 25 yrs)    3. Encounter for screening for diabetes mellitus  - Glucose; Future    4. Encounter for screening for lipoid disorders  - Lipid Cascade; Future    5. Moderate episode of recurrent major depressive disorder  Well-controlled on sertraline 100 mg daily.    6. Anxiety  Well-controlled on sertraline 100 mg daily.    7. Migraine  Currently well controlled.  He has not needed sumatriptan and for quite some time.  He still has it available if needed.    8. ADHD (attention deficit hyperactivity disorder)  Stable on Adderall 20 mg twice daily.    Next follow up:  No Follow-up on file.    Immunization Review  Adult Imm Review: No immunizations due today      I discussed the following with the patient:   Adult Healthy Living: Importance of regular exercise  Healthy nutrition        Subjective:   Chief Complaint: Henrry Gilliam is an 27 y.o. male here for a preventative health visit.     HPI: He denies concerns regarding hearing, vision, urination, bowel movements, sleep or mood.  He has a history of anxiety and depression which have been well controlled on sertraline 100 mg daily.  He also has a history of ADHD which has been well controlled on Adderall 20 mg twice daily.  He is not having adverse side effects to these medications.  He has sumatriptan available for migraine headaches, but it has been a while since his last headache.    Social history: He works as a  for [employer].  He works out on a regular basis    Healthy Habits  Are you taking a daily aspirin? No  Do you typically exercising at least 40 min, 3-4 times per week?  Yes  Do you usually  "eat at least 4 servings of fruit and vegetables a day, include whole grains and fiber and avoid regularly eating high fat foods? Yes  Have you had an eye exam in the past two years? Yes  Do you see a dentist twice per year? Yes  Do you have any concerns regarding sleep? No    Safety Screen  If you own firearms, are they secured in a locked gun cabinet or with trigger locks? The patient does not own any firearms  Do you feel you are safe where you are living?: Yes (5/7/2018  1:05 PM)  Do you feel you are safe in your relationship(s)?: Yes (5/7/2018  1:05 PM)    Review of Systems:  Please see above.  The rest of the review of systems are negative for all systems.     Cancer Screening     Patient has no health maintenance due at this time              History     Reviewed By Date/Time Sections Reviewed    Allie Roth CMA 5/7/2018  1:06 PM Tobacco    Allie Roth CMA 5/7/2018  1:05 PM Tobacco            Objective:   Vital Signs:   Visit Vitals     /66 (Patient Site: Right Arm, Patient Position: Sitting, Cuff Size: Adult Large)     Pulse 64     Temp 99  F (37.2  C) (Oral)     Resp 16     Ht 6' 2.5\" (1.892 m)     Wt 218 lb 5 oz (99 kg)     BMI 27.65 kg/m2          PHYSICAL EXAM  General Appearance: Alert, cooperative, no distress, appears stated age  Head: Normocephalic, without obvious abnormality, atraumatic  Eyes: PERRL, conjunctiva/corneas clear, EOM's intact  Ears: Normal TM's and external ear canals, both ears  Nose: Nares normal, septum midline,mucosa normal, no drainage  Throat: Lips, mucosa, and tongue normal; teeth and gums normal  Neck: Supple, symmetrical, trachea midline, no adenopathy;  thyroid: not enlarged, symmetric, no tenderness/mass/nodules  Back: Symmetric, no curvature, ROM normal, no CVA tenderness  Lungs: Clear to auscultation bilaterally, respirations unlabored  Heart: Regular rate and rhythm, S1 and S2 normal, no murmur, rub, or gallop,  Abdomen: Soft, non-tender, bowel sounds " active all four quadrants,  no masses, no organomegaly  Musculoskeletal: Normal range of motion. No joint swelling or deformity.   Extremities: Extremities normal, atraumatic, no cyanosis or edema  Skin: Skin color, texture, turgor normal, no rashes or lesions  Lymph nodes: Cervical, supraclavicular, and axillary nodes normal  Neurologic: He is alert.  Normal speech.  No focal deficits.  Normal deep tendon reflexes.   Psychiatric: He has a normal mood and affect.            Medication List          These changes are accurate as of 5/7/18  1:32 PM.  If you have any questions, ask your nurse or doctor.               CHANGE how you take these medications          SUMAtriptan 50 MG tablet   Also known as:  IMITREX   INSTRUCTIONS:  Take 1-2 tablets ( mg total) by mouth once as needed for migraine.   What changed:  Another medication with the same name was removed. Continue taking this medication, and follow the directions you see here.   Changed by:  Guru Augustine, DO             CONTINUE taking these medications          dextroamphetamine-amphetamine 20 mg Tab   Also known as:  ADDERALL   INSTRUCTIONS:  Take 20 mg by mouth daily. May take an additional dose in the afternoon if needed.           sertraline 100 MG tablet   Also known as:  ZOLOFT   INSTRUCTIONS:  Take 1 tablet (100 mg total) by mouth daily.                Where to Get Your Medications      Information about where to get these medications is not yet available     ! Ask your nurse or doctor about these medications      SUMAtriptan 50 MG tablet             Additional Screenings Completed Today:

## 2021-06-18 NOTE — PATIENT INSTRUCTIONS - HE
Patient Instructions by Guru Vásquez DO at 10/8/2020 11:40 AM     Author: Guru Vásquez DO Service: -- Author Type: Physician    Filed: 10/8/2020 11:49 AM Encounter Date: 10/8/2020 Status: Signed    : Guru Vásquez DO (Physician)           Firearm Safety  From 2005 to 2014, roughly 20,000 American minors were killed or seriously injured in accidental shootings; the majority of those killed in these tragic accidents were aged 12 or younger. As such the American Academy of Pediatrics, American Academy of Family Physicians, American College of Physicians and the National Rifle Association all recommend preventing unintentional use of firearms by securely storing firearms in locked safe or at minimum a trigger lock.  Patient Education   Understanding USDA MyPlate  The USDA (US Department of Agriculture) has guidelines to help you make healthy food choices. These are called MyPlate. MyPlate shows the food groups that make up healthy meals using the image of a place setting. Before you eat, think about the healthiest choices for what to put onto your plate or into your cup or bowl. To learn more about building a healthy plate, visit www.choosemyplate.gov.       The Food Groups    Fruits: Any fruit or 100% fruit juice counts as part of the Fruit Group. Fruits may be fresh, canned, frozen, or dried, and may be whole, cut-up, or pureed. Make half your plate fruits and vegetables.    Vegetables: Any vegetable or 100% vegetable juice counts as a member of the Vegetable Group. Vegetables may be fresh, frozen, canned, or dried. They can be served raw or cooked and may be whole, cut-up, or mashed. Make half your plate fruits and vegetables.     Grains: All foods made from grains are part of the Grains Group. These include wheat, rice, oats, cornmeal, and barley such as bread, pasta, oatmeal, cereal, tortillas, and grits. Grains should be no more than a quarter of your plate.  At least half of your grains should be whole grains.    Protein: This group includes meat, poultry, seafood, beans and peas, eggs, processed soy products (like tofu), nuts (including nut butters), and seeds. Make protein choices no more than a quarter of your plate. Meat and poultry choices should be lean or low fat.    Dairy: All fluid milk products and foods made from milk that contain calcium, like yogurt and cheese are part of the Dairy Group. (Foods that have little calcium, such as cream, butter, and cream cheese, are not part of the group.) Most dairy choices should be low-fat or fat-free.    Oils: These are fats that are liquid at room temperature. They include canola, corn, olive, soybean, and sunflower oil. Foods that are mainly oil include mayonnaise, certain salad dressings, and soft margarines. You should have only 5 to 7 teaspoons of oils a day. You probably already get this much from the food you eat.  Use [employer] to Help Build Your Meals  The [employer] can help you plan and track your meals and activity. You can look up individual foods to see or compare their nutritional value. You can get guidelines for what and how much you should eat. You can compare your food choices. And you can assess personal physical activities and see ways you can improve. Go to www.[employer].gov/supertracker/.    8212-9486 [employer]. [address] [place] [address], [place] [address]. All rights reserved. This information is not intended as a substitute for professional medical care. Always follow your healthcare professional's instructions.

## 2021-06-19 NOTE — LETTER
Letter by Josy Golden PA-C at      Author: Josy Golden PA-C Service: -- Author Type: --    Filed:  Encounter Date: 6/4/2019 Status: (Other)         Henrry Gilliam  [address] [place] [address]             June 4, 2019         Dear  Tawana,    Below are the results from your recent visit:    Resulted Orders   Chlamydia trachomatis & Neisseria gonorrhoeae, Amplified Detection   Result Value Ref Range    Chlamydia trachomatis, Amplified Detection Negative Negative    Neisseria gonorrhoeae, Amplified Detection Negative Negative        GC/Chl test is negative and  normal.    Please call with questions or contact us using [employer].    Sincerely,        Electronically signed by Josy Golden PA-C        PT is A&OX3, PT'S PAIN AND WOUND HAVE IMPROVED, PT IS ON ROOM AIR , PT DENIES
SOB WITH ACTIVITES, RN RECEIVED ORDER TO DC HOME WITH HOME HEALTH , IV abx ,
pt's L foot has replaced wound VAC by wound RN , RN has contact ID and
Pulmonary DR about PT DC TODAY LAB RESULTS,
RN HAS FAXED DC INSTRCUTION TO HOME HEALTH,PT NEEDS LAB AT 02/27/20 , CALL
RESLUT TO ID , PT RECEIVED DC TEACHING, AND HE UNDERSTANDED WELL, BUT PT
REBUSED TO GET IV ABX DOSE BEFORE DC TO HOME, PT 'S FAMILY  PT ABOUT
1730PM.

## 2021-06-20 NOTE — LETTER
Letter by Guru Vásquez DO at      Author: Guru Vásquez DO Service: -- Author Type: --    Filed:  Encounter Date: 1/7/2020 Status: Signed         [facility]  01/07/20    Patient: Henrry Gilliam  YOB: 1990  Medical Record Number: [id number]  CSN: [id number]                                                                              Non-opioid Controlled Substance Agreement    I understand that my care provider has prescribed a controlled substance to help manage my condition(s). I am taking this medicine to help me function or work. I know this is strong medicine, and that it can cause serious side effects. Controlled substances can be sedating, addicting and may cause a dependency on the drug. They can affect my ability to drive or think, and cause depression. They need to be taken exactly as prescribed. Combining controlled substances with certain medicines or chemicals (such as cocaine, sedatives and tranquilizers, sleeping pills, meth) can be dangerous or even fatal. Also, if I stop controlled substances suddenly, I may have severe withdrawal symptoms.  If not helpful, I may be asked to stop them.    The risks, benefits, and side effects of these medicine(s) were explained to me. I agree that:    1. I will take part in other treatments as advised by my care team. This may be psychiatry or counseling, physical therapy, behavioral therapy, group treatment or a referral to a pain clinic. I will reduce or stop my medicine when my care team tells me to do so.  2. I will take my medicines as prescribed. I will not change the dose or schedule unless my care team tells me to. There will be no refills if I run out early.  I may be contactedwithout warning and asked to complete a urine drug test or pill count at any time.   3. I will keep all my appointments, and understand this is part of the monitoring of controlled substances. My care team may require an  office visit for EVERY controlled substance refill. If I miss appointments or dont follow instructions, my care team may stop my medicine.  4. I will not ask other providers to prescribe controlled substances, and I will not accept controlled substances from other people. If I need another prescribed controlled substance for a new reason, I will tell my care team within 1 business day.  5. I will use one pharmacy to fill all of my controlled substance prescriptions, and it is up to me to make sure that I do not run out of my medicines on weekends or holidays. If my care team is willing to refill my controlled substance prescription without a visit, I must request refills only during office hours, refills may take up to 3 days to process, and it may take up to 5 to 7 days for my medicine to be mailed and ready at my pharmacy. Prescriptions will not be mailed anywhere except my pharmacy.    6. I am responsible for my prescriptions. If the medicine/prescription is lost or stolen, it will not be replaced. I also agree not to share controlled substance medicines with anyone.          [facility]  01/07/20  Patient:  Henrry Gilliam  YOB: 1990  Medical Record Number: [id number]  CSN: [id number]    7. I agree to not use ANY illegal or recreational drugs. This includes marijuana, cocaine, bath salts or other drugs. I agree not to use alcohol unless my care team says I may. I agree to give urine samples whenever asked. If I dont give a urine sample, the care team may stop my medicine.    8. If I enroll in the [place] Medical Marijuana program, I will tell my care team. I will also sign an agreement to share my medical records with my care team.    9. I will bring in my list of medicines (or my medicine bottles) each time I come to the clinic.   10. I will tell my care team right away if I become pregnant or have a new medical problem treated outside of my regular clinic.  11. I understand  that this medicine can affect my thinking and judgment. It may be unsafe for me to drive, use machinery and do dangerous tasks. I will not do any of these things until I know how the medicine affects me. If my dose changes, I will wait to see how it affects me. I will contact my care team if I have concerns about medicine side effects.    I understand that if I do not follow any of the conditions above, my prescriptions or treatment may be stopped.      I agree that my provider, clinic care team, and pharmacy may work with any city, state or federal law enforcement agency that investigates the misuse, sale, or other diversion of my controlled medicine. I will allow my provider to discuss my care with or share a copy of this agreement with any other treating provider, pharmacy or emergency room where I receive care. I agree to give up (waive) any right of privacy or confidentiality with respect to these consents.   I have read this agreement and have asked questions about anything I did not understand.    ___________________________________________________________________________  Patient signature - Date/Time  -Henrry Gilliam                                      ___________________________________________________________________________  Witness signature                                                                    ___________________________________________________________________________  Provider signature- Guru Augustine,

## 2021-06-20 NOTE — LETTER
Letter by Guru Vásquez DO at      Author: Guru Vásquez DO Service: -- Author Type: --    Filed:  Encounter Date: 9/30/2020 Status: (Other)       Henrry Gilliam  [address] [place] [address]      10/07/20      Dear Henrry,      In reviewing your records, we have determined a medication check is needed before your next refill, please call the [facility] to schedule an appointment.      Medication check/review    ADD Check up      We have made attempts to call you for an appointment, please verify your contact information is correct when calling back for an appointment, or if you have transferred your care to another clinic, please contact us so we can update our records.     Please call [phone] to schedule an appointment.    We believe that a strong preventative care program, including regular physicals and follow-up care is an important part of a healthy lifestyle and we are committed to helping you maintain your health.    Thank you for choosing us as your health care provider.    Sincerely,    Rozina Villa CMA - DIANELYS/CA  [facility]  [address] [id number] [phone]

## 2021-06-21 NOTE — LETTER
"Letter by Guru Vásquez DO at      Author: Guru Vásquez DO Service: -- Author Type: --    Filed:  Encounter Date: 1/4/2021 Status: (Other)       Henrry SHEPPARD Tawana  [address] [facility] [facility] [address]      01/11/21      Dear Henrry,    We have received your medical message.  Effective December 7th, Guru Augustine DO will no longer see patients at [facility].  If you would like to follow his practice to the [facility] Location please call the number below to schedule your appointment or send a message.      Note: The [facility] Location is unable to forward your message to this location as our (previously [employer]) computer is unable to \"communicate\" with the [employer].      [address]  [place], [place]  Scheduling appointments [phone] or [phone]  [email or web address]    If you would prefer to keep your health care with the [facility], our clinic staff is dedicated to helping you make a smooth transition to a new healthcare provider. The following providers at our clinic would be happy to partner with you for your healthcare needs:    ? Esteban Soliz MD Family Medicine  ? Vladimir Montague MD  Internal Medicine   ? Amandeep Marks MD  Internal Medicine  ? Yanelis Fernandez NP Internal Medicine  ? Prisca Holloway MD Internal Medicine  ? Renae Holder MD Family Medicine    Please note: If you currently have a prescription for medication and have no refills remaining, you need to establish care with a new provider before getting your prescription refilled.      If you have any questions or want more information about providers at our clinic or other [facility] Clinics, please visit [email or web address] or call [phone] (toll-free). Thank you for choosing [facility] for your medical care.    Sincerely,  Rozina Hui CMT/CA  [facility]" [facility]  [address] [id number] [phone]

## 2021-06-22 NOTE — PROGRESS NOTES
Assessment / Impression     1. Screen for STD (sexually transmitted disease)  Chlamydia trachomatis & Neisseria gonorrhoeae, Amplified Detection    HIV Antigen/Antibody Screening [facility]    Syphilis Screen, Cascade   2. Moderate episode of recurrent major depressive disorder (H)     3. Anxiety     4. Attention deficit hyperactivity disorder (ADHD), combined type     5. Migraine with aura and without status migrainosus, not intractable         The following are part of a depression follow up plan for the patient:  mental health care assessment    Plan:     He appears to be in good health.  We are going to check STD screening labs.  We discussed lowly weaning off sertraline which she is interested in trying.  He continues to do well on Adderall 20 mg twice daily.  He has sumatriptan available as needed for migraine headaches.  We are going to check a fasting cholesterol and lustral panel today since he was not able to get back into the clinic after his physical this past May.    Subjective:      HPI: Henrry Gilliam is a 28 y.o. male who presents to the clinic for follow-up.  His medical history significant for ADHD, combined type, depression, anxiety and migraine headaches.  He does like these medical issues are stable on his current medications.  He is interested in trying to wean off sertraline since the anxiety depression symptoms have been so much better lately.  He reports being headache free the past several weeks/months.  He still has a triptan available as needed.  He has been taking Adderall 20 mg twice daily for ADHD symptoms.  This continues to help with focus and distractibility.  He is currently studying for the [employer] and trying to get into business school.  He would like to have STD screening labs checked today.  He uses condoms when sexually active.  He denies having any symptoms of a sexually transmitted infection.        Review of Systems  All other systems reviewed and are negative.     Social  "History     Tobacco Use   Smoking Status Former Smoker   Smokeless Tobacco Former User   Tobacco Comment    once a month.       Family History   Problem Relation Age of Onset     Diabetes Maternal Grandmother      Diabetes Paternal Grandmother      Diabetes Paternal Grandfather      Migraines Mother        Objective:     /84   Pulse 64   Ht 6' 2.5\" (1.892 m)   Wt (!) 235 lb 12.8 oz (107 kg)   BMI 29.87 kg/m    Physical Examination: General appearance - alert, well appearing, and in no distress  Eyes: pupils equal and reactive, extraocular eye movements intact  Mouth: mucous membranes moist, pharynx normal without lesions  Neck: supple, no significant adenopathy  Lungs: clear to auscultation, no wheezes, rales or rhonchi, symmetric air entry  Heart: normal rate, regular rhythm, normal S1, S2, no murmurs, rubs, clicks or gallops  Abdomen: soft, nontender, nondistended, no masses or organomegaly  Neurological: alert, oriented, normal speech, no focal findings or movement disorder noted.    Extremities: No edema, no clubbing or cyanosis  Psychiatric: Normal affect. Does not appear anxious or depressed.  PHQ-9 score is 1.  He denies suicidal ideations. RAJ-7 score is 3.    No results found for this or any previous visit (from the past 168 hour(s)).    Current Outpatient Medications   Medication Sig     dextroamphetamine-amphetamine (ADDERALL) 20 mg Tab Take 20 mg by mouth daily May take an additional dose in the afternoon if needed..     sertraline (ZOLOFT) 100 MG tablet Take 1 tablet (100 mg total) by mouth daily.     SUMAtriptan (IMITREX) 50 MG tablet Take 1-2 tablets ( mg total) by mouth once as needed for migraine.     "

## 2021-06-23 NOTE — TELEPHONE ENCOUNTER
Controlled Substance Refill Request  Medication:   Requested Prescriptions     Pending Prescriptions Disp Refills     dextroamphetamine-amphetamine (ADDERALL) 20 mg Tab 60 tablet 0     Sig: Take 20 mg by mouth daily. May take an additional dose in the afternoon if needed.     Date Last Fill: 12/18/18   #60 R-0  Pharmacy: [id number]   Submit electronically to pharmacy  Controlled Substance Agreement on File:   Encounter-Level CSA Scan Date - 05/16/2017:    Scan on 5/18/2017  2:19 PM (below)         Last office visit: 12/5/2018 Guru Vásquez, DO Deonna Tracey RN [facility]

## 2021-06-24 NOTE — TELEPHONE ENCOUNTER
Controlled Substance Refill Request  Medication:   Requested Prescriptions     Pending Prescriptions Disp Refills     dextroamphetamine-amphetamine (ADDERALL) 20 mg Tab 60 tablet 0     Sig: Take 20 mg by mouth daily. May take an additional dose in the afternoon if needed.     Date Last Fill: 1/22/19  Pharmacy: [id number]   Submit electronically to pharmacy  escrip  Controlled Substance Agreement on File:   Encounter-Level CSA Scan Date - 05/16/2017:    Scan on 5/18/2017  2:19 PM (below)         Last office visit: Last office visit pertaining to requested medication was 12/5/18.

## 2021-06-28 NOTE — PROGRESS NOTES
Progress Notes by Guru Vásquez DO at 1/7/2020 10:00 AM     Author: Guru Vásquez DO Service: -- Author Type: Physician    Filed: 1/7/2020 10:40 AM Encounter Date: 1/7/2020 Status: Signed    : Guru Vásquez DO (Physician)       MALE PREVENTATIVE EXAM    Assessment and Plan:       1. Encounter for general adult medical examination without abnormal findings  I encouraged him to start exercising again and eat a healthy diet.  We are going to check fasting labs today.    2. Attention deficit hyperactivity disorder (ADHD), combined type  He was given a refill of Adderall which continues to work well for ADHD symptoms.  He is going to follow-up with me in 6 months or sooner if needed for this.  - dextroamphetamine-amphetamine (ADDERALL) 20 mg Tab; Take 20 mg by mouth daily. May take an additional dose in the afternoon if needed.  Dispense: 60 tablet; Refill: 0    3. Moderate episode of recurrent major depressive disorder (H)  He is currently on sertraline 100 mg daily.  He is interested in weaning off this medication if possible.  He is going to go down to taking 50 mg daily and see how things go over the next month.  If he continues to do well he is planning to wean down further until he is completely off.    4. Anxiety  He is planning to wean off sertraline this spring.    5. Migraine with aura and without status migrainosus, not intractable  Well controlled without medication.  He has not sumatriptan prescription available if needed, but he has not had a migraine in approximately 2 years.    6. Screen for STD (sexually transmitted disease)  - Chlamydia trachomatis & Neisseria gonorrhoeae, Amplified Detection  - HIV Antigen/Antibody Screening [place]  - Syphilis Screen, [facility]    7. Screening for diabetes mellitus  - Basic Metabolic Panel    8. Screening cholesterol level  - Lipid Cascade     Next follow up:  No follow-ups on file.    Immunization Review  Adult Imm  Review: No immunizations due today      I discussed the following with the patient:   Adult Healthy Living: Importance of regular exercise  Healthy nutrition        Subjective:   Chief Complaint: Henrry Gilliam is an 29 y.o. male here for a preventative health visit.     HPI: He denies concerns regarding hearing, vision, urination or bowel movements.  He has a history of ADHD, combined type, depression, anxiety and migraine headaches.  He feels like his medical issues are stable on his current medications.  He takes Adderall 20 mg twice daily and sertraline 100 mg daily.  He is thinking about cutting back and weaning off the sertraline this year.  He was started on this medication on 10/5/2017.    Social history: Single.  He works for [employer].    Healthy Habits  Are you taking a daily aspirin? No  Do you typically exercising at least 40 min, 3-4 times per week?  NO not lately.  Do you usually eat at least 4 servings of fruit and vegetables a day, include whole grains and fiber and avoid regularly eating high fat foods? Yes  Have you had an eye exam in the past two years? Yes  Do you see a dentist twice per year? Yes  Do you have any concerns regarding sleep? No    Safety Screen  If you own firearms, are they secured in a locked gun cabinet or with trigger locks? The patient does not own any firearms  Do you feel you are safe where you are living?: Yes (1/7/2020 10:05 AM)  Do you feel you are safe in your relationship(s)?: Yes (1/7/2020 10:05 AM)      Review of Systems:  Please see above.  The rest of the review of systems are negative for all systems.     Cancer Screening     Patient has no health maintenance due at this time              History     Reviewed By Date/Time Sections Reviewed    Allie Roth CMA 1/7/2020 10:06 AM Tobacco    Allie Roth CMA 1/7/2020 10:05 AM Tobacco            Objective:   Vital Signs:   Visit Vitals  /70 (Patient Site: Left Arm, Patient Position:  "Sitting, Cuff Size: Adult Large)   Pulse 60   Temp 97.5  F (36.4  C) (Oral)   Resp 16   Ht 6' 3\" (1.905 m)   Wt (!) 238 lb 6 oz (108.1 kg)   BMI 29.79 kg/m           PHYSICAL EXAM  General Appearance: Alert, cooperative, no distress, appears stated age  Head: Normocephalic, without obvious abnormality, atraumatic  Eyes: PERRL, conjunctiva/corneas clear, EOM's intact  Ears: Normal TM's and external ear canals, both ears  Nose: Nares normal, septum midline,mucosa normal, no drainage  Throat: Lips, mucosa, and tongue normal; teeth and gums normal  Neck: Supple, symmetrical, trachea midline, no adenopathy;  thyroid: not enlarged, symmetric, no tenderness/mass/nodules  Back: Symmetric, no curvature, ROM normal, no CVA tenderness  Lungs: Clear to auscultation bilaterally, respirations unlabored  Heart: Regular rate and rhythm, S1 and S2 normal, no murmur, rub, or gallop,  Abdomen: Soft, non-tender, bowel sounds active all four quadrants,  no masses, no organomegaly  Musculoskeletal: Normal range of motion. No joint swelling or deformity.   Extremities: Extremities normal, atraumatic, no cyanosis or edema  Skin: Skin color, texture, turgor normal, no rashes or lesions  Lymph nodes: Cervical, supraclavicular, and axillary nodes normal  Neurologic: He is alert.  Normal speech.  No focal deficits.  Normal deep tendon reflexes.   Psychiatric: He has a normal mood and affect.              Medication List          Accurate as of January 7, 2020 10:39 AM. If you have any questions, ask your nurse or doctor.            CONTINUE taking these medications    dextroamphetamine-amphetamine 20 mg Tab  Also known as:  Adderall  INSTRUCTIONS:  Take 20 mg by mouth daily. May take an additional dose in the afternoon if needed.        sertraline 100 MG tablet  Also known as:  Zoloft  INSTRUCTIONS:  Take 1 tablet (100 mg total) by mouth daily.              Where to Get Your Medications      These medications were sent to [facility] " #[id number] - [place], [place] - [address] AT [address]  [address], [place] [address]    Phone:  [phone]     dextroamphetamine-amphetamine 20 mg Tab         Additional Screenings Completed Today:

## 2021-07-03 NOTE — ADDENDUM NOTE
Addendum Note by Joann Pelaez MD at 4/6/2020  4:09 PM     Author: Joann Pelaez MD Service: -- Author Type: Physician    Filed: 4/6/2020  4:09 PM Encounter Date: 4/3/2020 Status: Signed    : Joann Pelaez MD (Physician)    Addended by: JOANN PELAZE on: 4/6/2020 04:09 PM        Modules accepted: Orders

## 2021-07-03 NOTE — ADDENDUM NOTE
Addendum Note by Guru Holden DO at 3/21/2017 12:46 PM     Author: Guru Holden DO Service: -- Author Type: Physician    Filed: 3/21/2017 12:46 PM Encounter Date: 3/21/2017 Status: Signed    : Guru Holden DO (Physician)    Addended by: GURU HOLDEN on: 3/21/2017 12:46 PM        Modules accepted: Orders

## 2021-07-03 NOTE — ADDENDUM NOTE
Addendum Note by Guru Holden DO at 9/16/2019  5:24 PM     Author: Guru Holden DO Service: -- Author Type: Physician    Filed: 9/16/2019  5:24 PM Encounter Date: 9/16/2019 Status: Signed    : Guru Holden DO (Physician)    Addended by: GURU HOLDEN on: 9/16/2019 05:24 PM        Modules accepted: Orders

## 2021-07-03 NOTE — ADDENDUM NOTE
Addendum Note by April Pierre CMA at 4/6/2020  2:50 PM     Author: April Pierre CMA Service: -- Author Type: Certified Medical Assistant    Filed: 4/6/2020  2:50 PM Encounter Date: 4/3/2020 Status: Signed    : April Pierre CMA (Certified Medical Assistant)    Addended by: APRIL PIERRE on: 4/6/2020 02:50 PM        Modules accepted: Orders

## 2021-07-03 NOTE — ADDENDUM NOTE
Addendum Note by James Naranjo CMA at 4/6/2020  3:54 PM     Author: James Naranjo CMA Service: -- Author Type: Certified Medical Assistant    Filed: 4/6/2020  3:54 PM Encounter Date: 4/3/2020 Status: Signed    : James Naranjo CMA (Certified Medical Assistant)    Addended by: JAMES NARANJO on: 4/6/2020 03:54 PM        Modules accepted: Orders

## 2021-07-03 NOTE — ADDENDUM NOTE
Addendum Note by Joann Pelaez MD at 4/6/2020 12:58 PM     Author: Joann Pelaez MD Service: -- Author Type: Physician    Filed: 4/6/2020 12:58 PM Encounter Date: 4/3/2020 Status: Signed    : Joann Pelaez MD (Physician)    Addended by: JOANN PELAEZ on: 4/6/2020 12:58 PM        Modules accepted: Orders

## 2021-07-07 ENCOUNTER — MYC REFILL (OUTPATIENT)
Dept: FAMILY MEDICINE | Facility: CLINIC | Age: 31
End: 2021-07-07

## 2021-07-07 DIAGNOSIS — F90.2 ADHD (ATTENTION DEFICIT HYPERACTIVITY DISORDER), COMBINED TYPE: ICD-10-CM

## 2021-07-07 DIAGNOSIS — J02.0 STREP THROAT: ICD-10-CM

## 2021-07-07 RX ORDER — DEXTROAMPHETAMINE SACCHARATE, AMPHETAMINE ASPARTATE, DEXTROAMPHETAMINE SULFATE AND AMPHETAMINE SULFATE 5; 5; 5; 5 MG/1; MG/1; MG/1; MG/1
20 TABLET ORAL 2 TIMES DAILY
Qty: 60 TABLET | Refills: 0 | Status: SHIPPED | OUTPATIENT
Start: 2021-07-07 | End: 2021-08-02

## 2021-07-07 RX ORDER — AMOXICILLIN 500 MG/1
500 CAPSULE ORAL 2 TIMES DAILY
Qty: 20 CAPSULE | Refills: 0 | Status: CANCELLED | OUTPATIENT
Start: 2021-07-07

## 2021-07-07 NOTE — TELEPHONE ENCOUNTER
Amoxicillin.    Given 5/27/21 for strep throat.  Needs OV if thinks he has again.  Laverne Rebolledo RN

## 2021-07-07 NOTE — TELEPHONE ENCOUNTER
EF[facility]IN ([facility]),    Please address due to DE's absence.  Refill request for Adderall 20 mg    Last OV 5/27/21  OV due every 6 months.    Thank you,  Laverne Rebolledo RN

## 2021-07-28 ENCOUNTER — TELEPHONE (OUTPATIENT)
Dept: FAMILY MEDICINE | Facility: CLINIC | Age: 31
End: 2021-07-28

## 2021-07-28 DIAGNOSIS — F41.9 ANXIETY: Primary | ICD-10-CM

## 2021-07-28 NOTE — TELEPHONE ENCOUNTER
Reason for Call:  Form, our goal is to have forms completed with 72 hours, however, some forms may require a visit or additional information.    Type of letter, form or note:  reasonable accommodation/modification request    Who is the form from?: sharron rodriguez managing agent  (if other please explain)    Where did the form come from: form was faxed in    What clinic location was the form placed at?: [facility]    Where the form was placed: [facility]  Box/Folder    What number is listed as a contact on the form?:   Fax [phone]       Additional comments:     Call taken on 7/28/2021 at 7:17 AM by Cesar Brown

## 2021-07-29 PROBLEM — F41.9 ANXIETY: Status: ACTIVE | Noted: 2021-07-29

## 2021-07-29 NOTE — TELEPHONE ENCOUNTER
Faxed to [facility] [phone]& message left on vm that from was faxed.Copy sent to scan.    Laverne AVILES

## 2021-08-30 ENCOUNTER — MYC REFILL (OUTPATIENT)
Dept: FAMILY MEDICINE | Facility: CLINIC | Age: 31
End: 2021-08-30

## 2021-08-30 DIAGNOSIS — F90.2 ADHD (ATTENTION DEFICIT HYPERACTIVITY DISORDER), COMBINED TYPE: ICD-10-CM

## 2021-08-30 RX ORDER — DEXTROAMPHETAMINE SACCHARATE, AMPHETAMINE ASPARTATE, DEXTROAMPHETAMINE SULFATE AND AMPHETAMINE SULFATE 5; 5; 5; 5 MG/1; MG/1; MG/1; MG/1
20 TABLET ORAL 2 TIMES DAILY
Qty: 60 TABLET | Refills: 0 | Status: SHIPPED | OUTPATIENT
Start: 2021-08-30 | End: 2021-09-27

## 2021-09-27 ENCOUNTER — MYC REFILL (OUTPATIENT)
Dept: FAMILY MEDICINE | Facility: CLINIC | Age: 31
End: 2021-09-27

## 2021-09-27 DIAGNOSIS — F90.2 ADHD (ATTENTION DEFICIT HYPERACTIVITY DISORDER), COMBINED TYPE: ICD-10-CM

## 2021-09-28 RX ORDER — DEXTROAMPHETAMINE SACCHARATE, AMPHETAMINE ASPARTATE, DEXTROAMPHETAMINE SULFATE AND AMPHETAMINE SULFATE 5; 5; 5; 5 MG/1; MG/1; MG/1; MG/1
20 TABLET ORAL 2 TIMES DAILY
Qty: 60 TABLET | Refills: 0 | Status: SHIPPED | OUTPATIENT
Start: 2021-09-28 | End: 2021-11-01

## 2021-09-30 ENCOUNTER — OFFICE VISIT (OUTPATIENT)
Dept: INTERNAL MEDICINE | Facility: CLINIC | Age: 31
End: 2021-09-30
Payer: COMMERCIAL

## 2021-09-30 VITALS
SYSTOLIC BLOOD PRESSURE: 112 MMHG | HEART RATE: 63 BPM | OXYGEN SATURATION: 96 % | DIASTOLIC BLOOD PRESSURE: 70 MMHG | TEMPERATURE: 97.4 F | HEIGHT: 76 IN | BODY MASS INDEX: 28.98 KG/M2 | WEIGHT: 238 LBS

## 2021-09-30 DIAGNOSIS — J02.9 SORE THROAT: Primary | ICD-10-CM

## 2021-09-30 DIAGNOSIS — H66.91 RIGHT OTITIS MEDIA, UNSPECIFIED OTITIS MEDIA TYPE: ICD-10-CM

## 2021-09-30 PROCEDURE — 99213 OFFICE O/P EST LOW 20 MIN: CPT | Performed by: NURSE PRACTITIONER

## 2021-09-30 RX ORDER — DEXAMETHASONE SODIUM PHOSPHATE 4 MG/ML
10 VIAL (ML) INJECTION ONCE
Status: COMPLETED | OUTPATIENT
Start: 2021-09-30 | End: 2021-09-30

## 2021-09-30 RX ORDER — CEFDINIR 300 MG/1
CAPSULE ORAL
COMMUNITY
Start: 2021-09-27 | End: 2022-02-01

## 2021-09-30 RX ORDER — AMOXICILLIN 875 MG
875 TABLET ORAL 2 TIMES DAILY
Qty: 14 TABLET | Refills: 0 | Status: SHIPPED | OUTPATIENT
Start: 2021-09-30 | End: 2021-12-03

## 2021-09-30 RX ADMIN — Medication 10 MG: at 11:16

## 2021-09-30 ASSESSMENT — MIFFLIN-ST. JEOR: SCORE: 2136.06

## 2021-09-30 NOTE — PROGRESS NOTES
"SUBJECTIVE:  Henrry Gilliam is a 31 year old male with a chief complaint of sore throat.  Onset of symptoms was 10 day(s) ago.    Course of illness: worsening.  Severity moderate      No past medical history on file.  Current Outpatient Medications   Medication Sig Dispense Refill     cefdinir (OMNICEF) 300 MG capsule TAKE 1 CAPSULE BY MOUTH TWICE A DAY FOR 10 DAYS       amphetamine-dextroamphetamine (ADDERALL) 20 MG tablet Take 1 tablet (20 mg) by mouth 2 times daily 60 tablet 0     Social History     Tobacco Use     Smoking status: Never Smoker     Smokeless tobacco: Never Used   Substance Use Topics     Alcohol use: Yes     Alcohol/week: 5.0 standard drinks     Types: 5 Cans of beer per week       ROS:  Unremarkable other than listed above and below    OBJECTIVE:   /70 (BP Location: Right arm, Patient Position: Sitting)   Pulse 63   Temp 97.4  F (36.3  C)   Ht 1.93 m (6' 4\")   Wt 108 kg (238 lb)   SpO2 96%   BMI 28.97 kg/m    GENERAL APPEARANCE: healthy, alert and no distress  EYES: EOMI,  PERRL, conjunctiva clear  HENT: ear canals clear. Left TM clear, right TM erythematous and bulging   Nose normal.  Pharynx erythematous with some exudate noted.  NECK: supple, mild tenderness with adenopathy appreciated   RESP: lungs clear to auscultation - no rales, rhonchi or wheezes  CV: regular rates and rhythm, normal S1 S2, no murmur noted  SKIN: no suspicious lesions or rashes        ASSESSMENT:      Sore throat  Right otitis media, unspecified otitis media type    PLAN:   See orders in epic.   Symptomatic treat with gargles, lozenges, and OTC analgesic as needed. Follow-up with primary clinic if not improving.    "

## 2021-10-03 ENCOUNTER — HEALTH MAINTENANCE LETTER (OUTPATIENT)
Age: 31
End: 2021-10-03

## 2021-10-28 ENCOUNTER — MYC REFILL (OUTPATIENT)
Dept: FAMILY MEDICINE | Facility: CLINIC | Age: 31
End: 2021-10-28

## 2021-10-28 DIAGNOSIS — F90.2 ADHD (ATTENTION DEFICIT HYPERACTIVITY DISORDER), COMBINED TYPE: ICD-10-CM

## 2021-10-28 RX ORDER — DEXTROAMPHETAMINE SACCHARATE, AMPHETAMINE ASPARTATE, DEXTROAMPHETAMINE SULFATE AND AMPHETAMINE SULFATE 5; 5; 5; 5 MG/1; MG/1; MG/1; MG/1
20 TABLET ORAL 2 TIMES DAILY
Qty: 60 TABLET | Refills: 0 | Status: CANCELLED | OUTPATIENT
Start: 2021-10-28

## 2021-12-02 ENCOUNTER — MYC REFILL (OUTPATIENT)
Dept: FAMILY MEDICINE | Facility: CLINIC | Age: 31
End: 2021-12-02

## 2021-12-02 DIAGNOSIS — F90.2 ADHD (ATTENTION DEFICIT HYPERACTIVITY DISORDER), COMBINED TYPE: ICD-10-CM

## 2021-12-03 RX ORDER — DEXTROAMPHETAMINE SACCHARATE, AMPHETAMINE ASPARTATE, DEXTROAMPHETAMINE SULFATE AND AMPHETAMINE SULFATE 5; 5; 5; 5 MG/1; MG/1; MG/1; MG/1
20 TABLET ORAL 2 TIMES DAILY
Qty: 60 TABLET | Refills: 0 | Status: SHIPPED | OUTPATIENT
Start: 2021-12-03 | End: 2022-01-05

## 2022-01-05 ENCOUNTER — MYC REFILL (OUTPATIENT)
Dept: FAMILY MEDICINE | Facility: CLINIC | Age: 32
End: 2022-01-05
Payer: COMMERCIAL

## 2022-01-05 DIAGNOSIS — F90.2 ADHD (ATTENTION DEFICIT HYPERACTIVITY DISORDER), COMBINED TYPE: ICD-10-CM

## 2022-01-10 RX ORDER — DEXTROAMPHETAMINE SACCHARATE, AMPHETAMINE ASPARTATE, DEXTROAMPHETAMINE SULFATE AND AMPHETAMINE SULFATE 5; 5; 5; 5 MG/1; MG/1; MG/1; MG/1
20 TABLET ORAL 2 TIMES DAILY
Qty: 60 TABLET | Refills: 0 | Status: SHIPPED | OUTPATIENT
Start: 2022-01-10 | End: 2022-02-01

## 2022-01-10 NOTE — TELEPHONE ENCOUNTER
DE,    Adderall refill:    Last OV 5/27/21.  Patient has future appointment with you 2/1/22 for his physical.    Needs refill for this month.  Thanks,  Laverne Rebolledo RN

## 2022-01-28 NOTE — PROGRESS NOTES
SUBJECTIVE:   CC: Henrry Gilliam is an 31 year old male who presents for preventative health visit.     {Patient has been advised of split billing requirements and indicates understanding: Yes  Healthy Habits:     Getting at least 3 servings of Calcium per day:  Yes    Bi-annual eye exam:  Yes    Dental care twice a year:  Yes    Sleep apnea or symptoms of sleep apnea:  None    Diet:  Regular (no restrictions)    Frequency of exercise:  2-3 days/week    Duration of exercise:  30-45 minutes    Taking medications regularly:  Yes    Medication side effects:  Not applicable    PHQ-2 Total Score: 0    Additional concerns today:  No    He has a history significant for ADHD combined type, anxiety and depression.  ADHD symptoms have been stable well-controlled on Adderall 20 mg twice daily.  He denies unwanted side effects.  Appetite and mood have been good.  He occasionally has some difficulty with sleep.  He admits that he has been under quite a bit of stress lately which may be contributing to this.  He recently changed jobs and he has fiancé are planning their wedding which is scheduled for this summer.                Today's PHQ-2 Score:   PHQ-2 ( 1999 [employer]) 2/1/2022   Q1: Little interest or pleasure in doing things 0   Q2: Feeling down, depressed or hopeless 0   PHQ-2 Score 0   PHQ-2 Total Score (12-17 Years)- Positive if 3 or more points; Administer PHQ-A if positive -   Q1: Little interest or pleasure in doing things Not at all   Q2: Feeling down, depressed or hopeless Not at all   PHQ-2 Score 0       Abuse: Current or Past(Physical, Sexual or Emotional)- No  Do you feel safe in your environment? Yes    Have you ever done Advance Care Planning? (For example, a Health Directive, POLST, or a discussion with a medical provider or your loved ones about your wishes): No, advance care planning information given to patient to review.  Patient declined advance care planning discussion at this time.    Social History  "    Tobacco Use     Smoking status: Never Smoker     Smokeless tobacco: Never Used   Substance Use Topics     Alcohol use: Yes     Alcohol/week: 5.0 standard drinks     Types: 5 Cans of beer per week     If you drink alcohol do you typically have >3 drinks per day or >7 drinks per week? No    Alcohol Use 2/1/2022   Prescreen: >3 drinks/day or >7 drinks/week? No   Prescreen: >3 drinks/day or >7 drinks/week? -   No flowsheet data found.    Last PSA: No results found for: PSA    Reviewed orders with patient. Reviewed health maintenance and updated orders accordingly - Yes  Lab work is in process    Reviewed and updated as needed this visit by clinical staff                Reviewed and updated as needed this visit by Provider                   Review of Systems   Constitutional: Negative for chills and fever.   HENT: Negative for congestion, ear pain, hearing loss and sore throat.    Eyes: Negative for pain and visual disturbance.   Respiratory: Negative for cough and shortness of breath.    Cardiovascular: Negative for chest pain, palpitations and peripheral edema.   Gastrointestinal: Negative for abdominal pain, constipation, diarrhea, heartburn, hematochezia and nausea.   Genitourinary: Negative for dysuria, frequency, genital sores, hematuria, impotence, penile discharge and urgency.   Musculoskeletal: Negative for arthralgias, joint swelling and myalgias.   Skin: Negative for rash.   Neurological: Negative for dizziness, weakness, headaches and paresthesias.   Psychiatric/Behavioral: Negative for mood changes. The patient is not nervous/anxious.          OBJECTIVE:   /78   Pulse 58   Temp 97.5  F (36.4  C) (Temporal)   Ht 1.914 m (6' 3.35\")   Wt 112.3 kg (247 lb 8 oz)   SpO2 96%   BMI 30.65 kg/m      Physical Exam  GENERAL: healthy, alert and no distress  EYES: Eyes grossly normal to inspection, PERRL and conjunctivae and sclerae normal  HENT: ear canals and TM's normal, nose and mouth without ulcers or " lesions  NECK: no adenopathy, no asymmetry, masses, or scars and thyroid normal to palpation  RESP: lungs clear to auscultation - no rales, rhonchi or wheezes  CV: regular rate and rhythm, normal S1 S2, no S3 or S4, no murmur, click or rub, no peripheral edema and peripheral pulses strong  ABDOMEN: soft, nontender, no hepatosplenomegaly, no masses and bowel sounds normal  MS: no gross musculoskeletal defects noted, no edema  SKIN: no suspicious lesions or rashes  NEURO: Normal strength and tone, mentation intact and speech normal  PSYCH: mentation appears normal, affect normal/bright    Diagnostic Test Results:  Labs reviewed in Epic    ASSESSMENT/PLAN:   1. Routine general medical examination at a health care facility  I encouraged him to get regular exercise and eat a healthy diet.  He is going to have fasting labs checked in the near future.    2. ADHD (attention deficit hyperactivity disorder), combined type  ADHD symptoms are stable well-controlled with Adderall 20 mg twice daily without unwanted side effects.  He may continue to get refills over the next 6 months when he will be due for a follow-up visit.  - amphetamine-dextroamphetamine (ADDERALL) 20 MG tablet; Take 1 tablet (20 mg) by mouth 2 times daily  Dispense: 60 tablet; Refill: 0  - amphetamine-dextroamphetamine (ADDERALL) 20 MG tablet; Take 1 tablet (20 mg) by mouth 2 times daily  Dispense: 60 tablet; Refill: 0  - amphetamine-dextroamphetamine (ADDERALL) 20 MG tablet; Take 1 tablet (20 mg) by mouth 2 times daily  Dispense: 60 tablet; Refill: 0    3. Anxiety  Anxiety symptoms are stable without medication.    4. Moderate episode of recurrent major depressive disorder (H)  Depression symptoms are stable without medication.  His PHQ-9 score is 4.  He denies suicidal ideations.    5. Class 1 obesity due to excess calories without serious comorbidity with body mass index (BMI) of 30.0 to 30.9 in adult  He will continue to work on lifestyle modifications to  "help with weight loss.    6. Screening cholesterol level  - Lipid Profile (Chol, Trig, HDL, LDL calc); Future    7. Screening for diabetes mellitus  - Basic metabolic panel  (Ca, Cl, CO2, Creat, Gluc, K, Na, BUN); Future      Patient has been advised of split billing requirements and indicates understanding: Yes    COUNSELING:   Reviewed preventive health counseling, as reflected in patient instructions       Regular exercise       Healthy diet/nutrition    Estimated body mass index is 30.65 kg/m  as calculated from the following:    Height as of this encounter: 1.914 m (6' 3.35\").    Weight as of this encounter: 112.3 kg (247 lb 8 oz).     Weight management plan: Discussed healthy diet and exercise guidelines    He reports that he has never smoked. He has never used smokeless tobacco.      Counseling Resources:  ATP IV Guidelines  Pooled Cohorts Equation Calculator  FRAX Risk Assessment  ICSI Preventive Guidelines  Dietary Guidelines for Americans, 2010  USDA's MyPlate  ASA Prophylaxis  Lung CA Screening    Guru Augustine, DO  [facility]  "

## 2022-02-01 ENCOUNTER — OFFICE VISIT (OUTPATIENT)
Dept: FAMILY MEDICINE | Facility: CLINIC | Age: 32
End: 2022-02-01
Payer: COMMERCIAL

## 2022-02-01 VITALS
HEIGHT: 75 IN | WEIGHT: 247.5 LBS | DIASTOLIC BLOOD PRESSURE: 78 MMHG | HEART RATE: 58 BPM | OXYGEN SATURATION: 96 % | SYSTOLIC BLOOD PRESSURE: 124 MMHG | TEMPERATURE: 97.5 F | BODY MASS INDEX: 30.77 KG/M2

## 2022-02-01 DIAGNOSIS — F33.1 MODERATE EPISODE OF RECURRENT MAJOR DEPRESSIVE DISORDER (H): ICD-10-CM

## 2022-02-01 DIAGNOSIS — Z13.1 SCREENING FOR DIABETES MELLITUS: ICD-10-CM

## 2022-02-01 DIAGNOSIS — E66.811 CLASS 1 OBESITY DUE TO EXCESS CALORIES WITHOUT SERIOUS COMORBIDITY WITH BODY MASS INDEX (BMI) OF 30.0 TO 30.9 IN ADULT: ICD-10-CM

## 2022-02-01 DIAGNOSIS — Z00.00 ROUTINE GENERAL MEDICAL EXAMINATION AT A HEALTH CARE FACILITY: Primary | ICD-10-CM

## 2022-02-01 DIAGNOSIS — F41.9 ANXIETY: ICD-10-CM

## 2022-02-01 DIAGNOSIS — E66.09 CLASS 1 OBESITY DUE TO EXCESS CALORIES WITHOUT SERIOUS COMORBIDITY WITH BODY MASS INDEX (BMI) OF 30.0 TO 30.9 IN ADULT: ICD-10-CM

## 2022-02-01 DIAGNOSIS — Z13.220 SCREENING CHOLESTEROL LEVEL: ICD-10-CM

## 2022-02-01 DIAGNOSIS — F90.2 ADHD (ATTENTION DEFICIT HYPERACTIVITY DISORDER), COMBINED TYPE: ICD-10-CM

## 2022-02-01 PROCEDURE — 90715 TDAP VACCINE 7 YRS/> IM: CPT | Performed by: FAMILY MEDICINE

## 2022-02-01 PROCEDURE — 99395 PREV VISIT EST AGE 18-39: CPT | Mod: 25 | Performed by: FAMILY MEDICINE

## 2022-02-01 PROCEDURE — 90471 IMMUNIZATION ADMIN: CPT | Performed by: FAMILY MEDICINE

## 2022-02-01 RX ORDER — DEXTROAMPHETAMINE SACCHARATE, AMPHETAMINE ASPARTATE, DEXTROAMPHETAMINE SULFATE AND AMPHETAMINE SULFATE 5; 5; 5; 5 MG/1; MG/1; MG/1; MG/1
20 TABLET ORAL 2 TIMES DAILY
Qty: 60 TABLET | Refills: 0 | Status: SHIPPED | OUTPATIENT
Start: 2022-02-01 | End: 2022-03-04

## 2022-02-01 RX ORDER — DEXTROAMPHETAMINE SACCHARATE, AMPHETAMINE ASPARTATE, DEXTROAMPHETAMINE SULFATE AND AMPHETAMINE SULFATE 5; 5; 5; 5 MG/1; MG/1; MG/1; MG/1
20 TABLET ORAL 2 TIMES DAILY
Qty: 60 TABLET | Refills: 0 | Status: SHIPPED | OUTPATIENT
Start: 2022-04-04 | End: 2022-05-04

## 2022-02-01 RX ORDER — DEXTROAMPHETAMINE SACCHARATE, AMPHETAMINE ASPARTATE, DEXTROAMPHETAMINE SULFATE AND AMPHETAMINE SULFATE 5; 5; 5; 5 MG/1; MG/1; MG/1; MG/1
20 TABLET ORAL 2 TIMES DAILY
Qty: 60 TABLET | Refills: 0 | Status: SHIPPED | OUTPATIENT
Start: 2022-03-04 | End: 2022-03-04

## 2022-02-01 ASSESSMENT — ENCOUNTER SYMPTOMS
DYSURIA: 0
CONSTIPATION: 0
COUGH: 0
SORE THROAT: 0
DIARRHEA: 0
HEARTBURN: 0
CHILLS: 0
NERVOUS/ANXIOUS: 0
HEMATOCHEZIA: 0
FREQUENCY: 0
MYALGIAS: 0
EYE PAIN: 0
HEMATURIA: 0
HEADACHES: 0
ARTHRALGIAS: 0
NAUSEA: 0
DIZZINESS: 0
FEVER: 0
PARESTHESIAS: 0
PALPITATIONS: 0
JOINT SWELLING: 0
ABDOMINAL PAIN: 0
SHORTNESS OF BREATH: 0
WEAKNESS: 0

## 2022-02-01 ASSESSMENT — ANXIETY QUESTIONNAIRES
2. NOT BEING ABLE TO STOP OR CONTROL WORRYING: SEVERAL DAYS
3. WORRYING TOO MUCH ABOUT DIFFERENT THINGS: SEVERAL DAYS
7. FEELING AFRAID AS IF SOMETHING AWFUL MIGHT HAPPEN: NOT AT ALL
5. BEING SO RESTLESS THAT IT IS HARD TO SIT STILL: NOT AT ALL
GAD7 TOTAL SCORE: 2
6. BECOMING EASILY ANNOYED OR IRRITABLE: NOT AT ALL
1. FEELING NERVOUS, ANXIOUS, OR ON EDGE: NOT AT ALL

## 2022-02-01 ASSESSMENT — PATIENT HEALTH QUESTIONNAIRE - PHQ9
SUM OF ALL RESPONSES TO PHQ QUESTIONS 1-9: 4
5. POOR APPETITE OR OVEREATING: NOT AT ALL

## 2022-02-01 ASSESSMENT — MIFFLIN-ST. JEOR: SCORE: 2168.89

## 2022-02-01 NOTE — NURSING NOTE
Prior to immunization administration, verified patients identity using patient s name and date of birth. Please see Immunization Activity for additional information.     Screening Questionnaire for Adult Immunization    Are you sick today?   No   Do you have allergies to medications, food, a vaccine component or latex?   No   Have you ever had a serious reaction after receiving a vaccination?   No   Do you have a long-term health problem with heart, lung, kidney, or metabolic disease (e.g., diabetes), asthma, a blood disorder, no spleen, complement component deficiency, a cochlear implant, or a spinal fluid leak?  Are you on long-term aspirin therapy?   No   Do you have cancer, leukemia, HIV/AIDS, or any other immune system problem?   No   Do you have a parent, brother, or sister with an immune system problem?   No   In the past 3 months, have you taken medications that affect  your immune system, such as prednisone, other steroids, or anticancer drugs; drugs for the treatment of rheumatoid arthritis, Crohn s disease, or psoriasis; or have you had radiation treatments?   No   Have you had a seizure, or a brain or other nervous system problem?   No   During the past year, have you received a transfusion of blood or blood    products, or been given immune (gamma) globulin or antiviral drug?   No   For women: Are you pregnant or is there a chance you could become       pregnant during the next month?   No   Have you received any vaccinations in the past 4 weeks?   No     Immunization questionnaire answers were all negative.        Per orders of Dr. Haider, injection of TDAP given by Cynthia Gil CMA. Patient instructed to remain in clinic for 15 minutes afterwards, and to report any adverse reaction to me immediately.       Screening performed by Cynthia Gil CMA on 2/1/2022 at 2:10 PM.

## 2022-02-02 ASSESSMENT — ANXIETY QUESTIONNAIRES: GAD7 TOTAL SCORE: 2

## 2022-02-07 ENCOUNTER — TELEPHONE (OUTPATIENT)
Dept: FAMILY MEDICINE | Facility: CLINIC | Age: 32
End: 2022-02-07
Payer: COMMERCIAL

## 2022-02-07 NOTE — TELEPHONE ENCOUNTER
Prior Authorization Retail Medication Request    Medication/Dose: amphetamine-dextroamphetamine (ADDERALL) 20 MG tablet  ICD code (if different than what is on RX):    Previously Tried and Failed:    Rationale:      Insurance Name:  [phone]  Insurance ID:  [id number]      Pharmacy Information (if different than what is on RX)  Name:  [facility] [address]    Phone:  [phone]

## 2022-02-12 NOTE — TELEPHONE ENCOUNTER
PA Initiation    Medication: amphetamine-dextroamphetamine (ADDERALL) 20 MG tablet   Insurance Company: [facility] - Phone [phone] Fax [phone]  Pharmacy Filling the Rx: [id number] - [address]  Filling Pharmacy Phone: [phone]  Filling Pharmacy Fax: [phone]  Start Date: 2/12/2022

## 2022-02-14 NOTE — TELEPHONE ENCOUNTER
Prior Authorization Approval    Authorization Effective Date: 2/12/2022  Authorization Expiration Date: 2/11/2025  Medication: amphetamine-dextroamphetamine (ADDERALL) 20 MG tablet--APPROVED  Approved Dose/Quantity:   Reference #:     Insurance Company: [facility] [employer] - Phone [phone] Fax [phone]  Expected CoPay:       CoPay Card Available:      Foundation Assistance Needed:    Which Pharmacy is filling the prescription (Not needed for infusion/clinic administered): [id number] - [address]  Pharmacy Notified: Yes  Patient Notified: Yes **Instructed pharmacy to notify patient when script is ready to /ship.**

## 2022-03-04 ENCOUNTER — OFFICE VISIT (OUTPATIENT)
Dept: FAMILY MEDICINE | Facility: CLINIC | Age: 32
End: 2022-03-04
Payer: COMMERCIAL

## 2022-03-04 VITALS
SYSTOLIC BLOOD PRESSURE: 92 MMHG | OXYGEN SATURATION: 98 % | BODY MASS INDEX: 29.95 KG/M2 | DIASTOLIC BLOOD PRESSURE: 66 MMHG | HEART RATE: 65 BPM | WEIGHT: 241.9 LBS | RESPIRATION RATE: 20 BRPM

## 2022-03-04 DIAGNOSIS — K42.9 UMBILICAL HERNIA WITHOUT OBSTRUCTION AND WITHOUT GANGRENE: Primary | ICD-10-CM

## 2022-03-04 PROCEDURE — 99213 OFFICE O/P EST LOW 20 MIN: CPT | Performed by: STUDENT IN AN ORGANIZED HEALTH CARE EDUCATION/TRAINING PROGRAM

## 2022-03-04 RX ORDER — SUMATRIPTAN 50 MG/1
50-100 TABLET, FILM COATED ORAL PRN
COMMUNITY
Start: 2020-11-23

## 2022-03-04 NOTE — PROGRESS NOTES
Assessment & Plan   Problem List Items Addressed This Visit     None      Visit Diagnoses     Umbilical hernia without obstruction and without gangrene    -  Primary    Relevant Orders    Adult General Surg Referral         PEx consistent with nonstrangulated umbilical hernia  Without TTP. VSS.  Is not protruding today.  Patient education regarding umbilical hernia is provided.  Gen surg referral placed. Red flag sxs regarding strangulated hernia that would warrant immediate visit to the emergency room.  Patient verbalized understanding and had no further questions.        28 minutes spent on the date of the encounter doing chart review, history and exam, documentation and further activities per the note  No follow-ups on file.    Renae Phoenix DO  [facility]    Anamaria Sales is a 31 year old who presents for the following health issues: umbilical pain .  .  Patient is a relatively healthy 31-year-old male with past medical history of anxiety and ADHD    Is presenting to the office today because he experienced umbilical pain for 1 to 2 hours 48 hours ago.  It has since then resolved completely.    Reports that about 2 years ago, started to gain a little bit of weight.  Has gained about 20 pounds over the last 2 years.  Since he started to gain weight, has noticed that his bellybutton has started to protrude more and more.  I have gotten bigger over time.  There is a bulge there at rest but really has started to bother him if he is active or lifting things.  Recently started to work out more and his bellybutton has been bothering him more and more.  About 2 days ago, he was doing a workout when his umbilicus started to hurt.  It lasted for 1 to 2 hours and then he was back to baseline.  Has not had any recurrent episodes.  Has tried to hernia back in but is not sure if he is doing it correct.  There is no significant pain when he presses there now.  There is no overlying skin  changes.  No fevers, chills, nausea, bowel movement changes, night sweats or unintentional weight loss.    Does not recall any trauma to the area or any one incident that caused his bellybutton to pop out.      Review of Systems   As per HPI       Objective    BP 92/66 (BP Location: Right arm, Patient Position: Sitting, Cuff Size: Adult Large)   Pulse 65   Resp 20   Wt 109.7 kg (241 lb 14.4 oz)   SpO2 98%   BMI 29.95 kg/m    Body mass index is 29.95 kg/m .  Physical Exam   GENERAL: healthy, alert and no distress  NECK: no adenopathy, no asymmetry, masses, or scars and thyroid normal to palpation  RESP: lungs clear to auscultation - no rales, rhonchi or wheezes  CV: regular rate and rhythm, normal S1 S2, no S3 or S4, no murmur, click or rub, no peripheral edema and peripheral pulses strong  ABDOMEN: (+) umbilical hernia without protrusion +  without TTP or overlying swelling/Redness at the umbilical.

## 2022-03-06 ENCOUNTER — MYC MEDICAL ADVICE (OUTPATIENT)
Dept: FAMILY MEDICINE | Facility: CLINIC | Age: 32
End: 2022-03-06
Payer: COMMERCIAL

## 2022-03-06 DIAGNOSIS — F90.2 ADHD (ATTENTION DEFICIT HYPERACTIVITY DISORDER), COMBINED TYPE: ICD-10-CM

## 2022-03-07 RX ORDER — DEXTROAMPHETAMINE SACCHARATE, AMPHETAMINE ASPARTATE, DEXTROAMPHETAMINE SULFATE AND AMPHETAMINE SULFATE 5; 5; 5; 5 MG/1; MG/1; MG/1; MG/1
20 TABLET ORAL 2 TIMES DAILY
Qty: 60 TABLET | Refills: 0 | Status: ON HOLD | OUTPATIENT
Start: 2022-03-07 | End: 2022-03-11

## 2022-03-07 NOTE — TELEPHONE ENCOUNTER
DE.    Please see [employer] message.  Asking for Adderall Rx for March.  States one at pharmacy if for April.    Called [facility] to confirm they only received Rx for February and April   Order T'd up    Thanks,  Laverne Rebolledo RN

## 2022-03-08 ENCOUNTER — OFFICE VISIT (OUTPATIENT)
Dept: SURGERY | Facility: CLINIC | Age: 32
End: 2022-03-08
Attending: STUDENT IN AN ORGANIZED HEALTH CARE EDUCATION/TRAINING PROGRAM
Payer: COMMERCIAL

## 2022-03-08 ENCOUNTER — LAB (OUTPATIENT)
Dept: LAB | Facility: CLINIC | Age: 32
End: 2022-03-08

## 2022-03-08 VITALS — SYSTOLIC BLOOD PRESSURE: 110 MMHG | DIASTOLIC BLOOD PRESSURE: 70 MMHG

## 2022-03-08 DIAGNOSIS — Z20.822 ENCOUNTER FOR LABORATORY TESTING FOR COVID-19 VIRUS: Primary | ICD-10-CM

## 2022-03-08 DIAGNOSIS — K42.9 UMBILICAL HERNIA WITHOUT OBSTRUCTION AND WITHOUT GANGRENE: ICD-10-CM

## 2022-03-08 PROCEDURE — 99203 OFFICE O/P NEW LOW 30 MIN: CPT | Performed by: SURGERY

## 2022-03-08 PROCEDURE — U0003 INFECTIOUS AGENT DETECTION BY NUCLEIC ACID (DNA OR RNA); SEVERE ACUTE RESPIRATORY SYNDROME CORONAVIRUS 2 (SARS-COV-2) (CORONAVIRUS DISEASE [COVID-19]), AMPLIFIED PROBE TECHNIQUE, MAKING USE OF HIGH THROUGHPUT TECHNOLOGIES AS DESCRIBED BY CMS-2020-01-R: HCPCS

## 2022-03-08 PROCEDURE — U0005 INFEC AGEN DETEC AMPLI PROBE: HCPCS

## 2022-03-08 RX ORDER — CEFAZOLIN SODIUM/WATER 2 G/20 ML
2 SYRINGE (ML) INTRAVENOUS SEE ADMIN INSTRUCTIONS
Status: CANCELLED | OUTPATIENT
Start: 2022-03-11

## 2022-03-08 RX ORDER — CEFAZOLIN SODIUM/WATER 2 G/20 ML
2 SYRINGE (ML) INTRAVENOUS
Status: CANCELLED | OUTPATIENT
Start: 2022-03-11

## 2022-03-08 NOTE — PROGRESS NOTES
General Surgery H&P  Henrry Gilliam MRN# [id number]   Age/Sex: 31 year old male YOB: 1990     Reason for visit: 1. Umbilical hernia without obstruction and without gangrene            Referring physician: Renae Phoenix DO                   Assessment and Plan:   Assessment:  1. Umbilical hernia without obstruction -patient does have an umbilical hernia    Plan:  -Offered the patient a procedure of robotic assisted laparoscopic repair of ventral hernia with mesh placement, possible open.    -I discussed in detail with the patient regarding mesh placement.  I explained the different options of repairing the hernia.  If the hernia was repaired primarily without mesh, there is a higher chance of recurrence.  We discussed in detail using synthetic versus biologic mesh.  I explained to the different scenarios in which each mesh would be more appropriate.  Patient was allowed time to ask questions and concerns regarding mesh placement.  The patient is accepting of the use of mesh with the hernia repair.    - The risks and benefits of the procedure were explained detail to the patient. The risks include infection, bleeding, damage to the surrounding structures. Patient verbalized understanding provided consent to undergo the procedure above.            Chief Complaint:     Chief Complaint   Patient presents with     Hernia     umbilical hernia        History is obtained from the patient    HPI:   Henrry Gilliam is a 31 year old male who presents with abdominal pain.  Patient states that he developed abdominal pain a couple days ago.  The abdominal pain was located at the umbilical region.  Sharp pain.  He then noticed a umbilical swelling.  He was able to push it back in.  Patient has never had umbilical hernia in the past.  Patient has recently gained some weight and as result feels that his muscle strength has lessened in the abdominal region.  Patient has never had any abdominal surgeries in the past.   The patient has no difficulty with bowel movements no nausea no vomiting.  He is tolerating p.o. diet.          Past Medical History:   History reviewed. No pertinent past medical history.           Past Surgical History:     Past Surgical History:   Procedure Laterality Date     ANTERIOR CRUCIATE LIGAMENT REPAIR Right 2012, 2014             Social History:    reports that he has never smoked. He has never used smokeless tobacco. He reports current alcohol use of about 5.0 standard drinks of alcohol per week. He reports that he does not use drugs.           Family History:     Family History   Problem Relation Age of Onset     Diabetes Maternal Grandmother      Diabetes Paternal Grandmother      Diabetes Paternal Grandfather      Migraines Mother               Allergies:   No Known Allergies           Medications:     Prior to Admission medications    Medication Sig Start Date End Date Taking? Authorizing Provider   amphetamine-dextroamphetamine (ADDERALL) 20 MG tablet Take 1 tablet (20 mg) by mouth 2 times daily 3/7/22 4/6/22 Yes Guru Vásquez, DO   amphetamine-dextroamphetamine (ADDERALL) 20 MG tablet Take 1 tablet (20 mg) by mouth 2 times daily 4/4/22 5/4/22 Yes Guru Vásquez DO   SUMAtriptan (IMITREX) 50 MG tablet Take  mg by mouth as needed 11/23/20  Yes Reported, Patient              Review of Systems:   A 12 point Review of Systems is negative other than noted in the HPI            Physical Exam:     Patient Vitals for the past 24 hrs:   BP   03/08/22 1254 110/70        [unfilled]   Constitutional:   awake, alert, cooperative, no apparent distress, and appears stated age       Eyes:   PERRL, conjunctiva/corneas clear, EOM's intact; no scleral edema or icterus noted        ENT:   Normocephalic, without obvious abnormality, atraumatic, Lips, mucosa, and tongue normal        Hematologic / Lymphatic:   No lymphadenopathy       Lungs:   Normal respiratory effort, no accessory  muscle use, breath sounds bilaterally on auscultation       Cardiovascular:   Regular rate and rhythm       Abdomen:   Soft, nondistended, patient has a protruding umbilical hernia.  You can reduce the hernia but it spontaneously recurs after removing pressure.  There is tenderness to palpation.  No skin changes over the site.       Musculoskeletal:   No obvious swelling, bruising or deformity       Skin:   Skin color and texture normal for patient, no rashes or lesions              Data:         All imaging studies reviewed by me. I reviewed the images, and I agree with the interpretation from the radiologist.     No results found for this or any previous visit (from the past 24 hour(s)).     DO Shay Garcia DO  General Surgeon  [facility]  Surgery [address] [address]?  Office: [phone]  Employed by - [employer]  Pager: [phone]

## 2022-03-08 NOTE — LETTER
3/8/2022         RE: Henrry Gilliam  [address] [place] [address]        Dear Colleague,    Thank you for referring your patient, Henrry Gilliam, to the [facility]. Please see a copy of my visit note below.    General Surgery H&P  Henrry Gilliam MRN# [id number]   Age/Sex: 31 year old male YOB: 1990     Reason for visit: 1. Umbilical hernia without obstruction and without gangrene            Referring physician: Renae Phoenix DO                   Assessment and Plan:   Assessment:  1. Umbilical hernia without obstruction -patient does have an umbilical hernia    Plan:  -Offered the patient a procedure of robotic assisted laparoscopic repair of ventral hernia with mesh placement, possible open.    -I discussed in detail with the patient regarding mesh placement.  I explained the different options of repairing the hernia.  If the hernia was repaired primarily without mesh, there is a higher chance of recurrence.  We discussed in detail using synthetic versus biologic mesh.  I explained to the different scenarios in which each mesh would be more appropriate.  Patient was allowed time to ask questions and concerns regarding mesh placement.  The patient is accepting of the use of mesh with the hernia repair.    - The risks and benefits of the procedure were explained detail to the patient. The risks include infection, bleeding, damage to the surrounding structures. Patient verbalized understanding provided consent to undergo the procedure above.            Chief Complaint:     Chief Complaint   Patient presents with     Hernia     umbilical hernia        History is obtained from the patient    HPI:   Henrry Gilliam is a 31 year old male who presents with abdominal pain.  Patient states that he developed abdominal pain a couple days ago.  The abdominal pain was located at the umbilical region.  Sharp pain.  He then noticed a  umbilical swelling.  He was able to push it back in.  Patient has never had umbilical hernia in the past.  Patient has recently gained some weight and as result feels that his muscle strength has lessened in the abdominal region.  Patient has never had any abdominal surgeries in the past.  The patient has no difficulty with bowel movements no nausea no vomiting.  He is tolerating p.o. diet.          Past Medical History:   History reviewed. No pertinent past medical history.           Past Surgical History:     Past Surgical History:   Procedure Laterality Date     ANTERIOR CRUCIATE LIGAMENT REPAIR Right 2012, 2014             Social History:    reports that he has never smoked. He has never used smokeless tobacco. He reports current alcohol use of about 5.0 standard drinks of alcohol per week. He reports that he does not use drugs.           Family History:     Family History   Problem Relation Age of Onset     Diabetes Maternal Grandmother      Diabetes Paternal Grandmother      Diabetes Paternal Grandfather      Migraines Mother               Allergies:   No Known Allergies           Medications:     Prior to Admission medications    Medication Sig Start Date End Date Taking? Authorizing Provider   amphetamine-dextroamphetamine (ADDERALL) 20 MG tablet Take 1 tablet (20 mg) by mouth 2 times daily 3/7/22 4/6/22 Yes Guru Vásquez, DO   amphetamine-dextroamphetamine (ADDERALL) 20 MG tablet Take 1 tablet (20 mg) by mouth 2 times daily 4/4/22 5/4/22 Yes Guru Vásquez, DO   SUMAtriptan (IMITREX) 50 MG tablet Take  mg by mouth as needed 11/23/20  Yes Reported, Patient              Review of Systems:   A 12 point Review of Systems is negative other than noted in the HPI            Physical Exam:     Patient Vitals for the past 24 hrs:   BP   03/08/22 1254 110/70        [unfilled]   Constitutional:   awake, alert, cooperative, no apparent distress, and appears stated age       Eyes:    PERRL, conjunctiva/corneas clear, EOM's intact; no scleral edema or icterus noted        ENT:   Normocephalic, without obvious abnormality, atraumatic, Lips, mucosa, and tongue normal        Hematologic / Lymphatic:   No lymphadenopathy       Lungs:   Normal respiratory effort, no accessory muscle use, breath sounds bilaterally on auscultation       Cardiovascular:   Regular rate and rhythm       Abdomen:   Soft, nondistended, patient has a protruding umbilical hernia.  You can reduce the hernia but it spontaneously recurs after removing pressure.  There is tenderness to palpation.  No skin changes over the site.       Musculoskeletal:   No obvious swelling, bruising or deformity       Skin:   Skin color and texture normal for patient, no rashes or lesions              Data:         All imaging studies reviewed by me. I reviewed the images, and I agree with the interpretation from the radiologist.     No results found for this or any previous visit (from the past 24 hour(s)).     DO Shay Garcia DO  General Surgeon  [facility]  Surgery [address] [address]?  Office: [phone]  Employed by - [employer]  Pager: [phone]             Again, thank you for allowing me to participate in the care of your patient.        Sincerely,        Shay Nguyen DO

## 2022-03-08 NOTE — H&P (VIEW-ONLY)
General Surgery H&P  Henrry Gilliam MRN# [id number]   Age/Sex: 31 year old male YOB: 1990     Reason for visit: 1. Umbilical hernia without obstruction and without gangrene            Referring physician: Renae Phoenix DO                   Assessment and Plan:   Assessment:  1. Umbilical hernia without obstruction -patient does have an umbilical hernia    Plan:  -Offered the patient a procedure of robotic assisted laparoscopic repair of ventral hernia with mesh placement, possible open.    -I discussed in detail with the patient regarding mesh placement.  I explained the different options of repairing the hernia.  If the hernia was repaired primarily without mesh, there is a higher chance of recurrence.  We discussed in detail using synthetic versus biologic mesh.  I explained to the different scenarios in which each mesh would be more appropriate.  Patient was allowed time to ask questions and concerns regarding mesh placement.  The patient is accepting of the use of mesh with the hernia repair.    - The risks and benefits of the procedure were explained detail to the patient. The risks include infection, bleeding, damage to the surrounding structures. Patient verbalized understanding provided consent to undergo the procedure above.            Chief Complaint:     Chief Complaint   Patient presents with     Hernia     umbilical hernia        History is obtained from the patient    HPI:   Henrry Gilliam is a 31 year old male who presents with abdominal pain.  Patient states that he developed abdominal pain a couple days ago.  The abdominal pain was located at the umbilical region.  Sharp pain.  He then noticed a umbilical swelling.  He was able to push it back in.  Patient has never had umbilical hernia in the past.  Patient has recently gained some weight and as result feels that his muscle strength has lessened in the abdominal region.  Patient has never had any abdominal surgeries in the past.   The patient has no difficulty with bowel movements no nausea no vomiting.  He is tolerating p.o. diet.          Past Medical History:   History reviewed. No pertinent past medical history.           Past Surgical History:     Past Surgical History:   Procedure Laterality Date     ANTERIOR CRUCIATE LIGAMENT REPAIR Right 2012, 2014             Social History:    reports that he has never smoked. He has never used smokeless tobacco. He reports current alcohol use of about 5.0 standard drinks of alcohol per week. He reports that he does not use drugs.           Family History:     Family History   Problem Relation Age of Onset     Diabetes Maternal Grandmother      Diabetes Paternal Grandmother      Diabetes Paternal Grandfather      Migraines Mother               Allergies:   No Known Allergies           Medications:     Prior to Admission medications    Medication Sig Start Date End Date Taking? Authorizing Provider   amphetamine-dextroamphetamine (ADDERALL) 20 MG tablet Take 1 tablet (20 mg) by mouth 2 times daily 3/7/22 4/6/22 Yes Guru Vásquez, DO   amphetamine-dextroamphetamine (ADDERALL) 20 MG tablet Take 1 tablet (20 mg) by mouth 2 times daily 4/4/22 5/4/22 Yes Guru Vásquez DO   SUMAtriptan (IMITREX) 50 MG tablet Take  mg by mouth as needed 11/23/20  Yes Reported, Patient              Review of Systems:   A 12 point Review of Systems is negative other than noted in the HPI            Physical Exam:     Patient Vitals for the past 24 hrs:   BP   03/08/22 1254 110/70        [unfilled]   Constitutional:   awake, alert, cooperative, no apparent distress, and appears stated age       Eyes:   PERRL, conjunctiva/corneas clear, EOM's intact; no scleral edema or icterus noted        ENT:   Normocephalic, without obvious abnormality, atraumatic, Lips, mucosa, and tongue normal        Hematologic / Lymphatic:   No lymphadenopathy       Lungs:   Normal respiratory effort, no accessory  muscle use, breath sounds bilaterally on auscultation       Cardiovascular:   Regular rate and rhythm       Abdomen:   Soft, nondistended, patient has a protruding umbilical hernia.  You can reduce the hernia but it spontaneously recurs after removing pressure.  There is tenderness to palpation.  No skin changes over the site.       Musculoskeletal:   No obvious swelling, bruising or deformity       Skin:   Skin color and texture normal for patient, no rashes or lesions              Data:         All imaging studies reviewed by me. I reviewed the images, and I agree with the interpretation from the radiologist.     No results found for this or any previous visit (from the past 24 hour(s)).     DO Shay Garcia DO  General Surgeon  [facility]  Surgery [address] [address]?  Office: [phone]  Employed by - [facility]  Pager: [phone]

## 2022-03-09 LAB — SARS-COV-2 RNA RESP QL NAA+PROBE: NEGATIVE

## 2022-03-11 ENCOUNTER — ANESTHESIA (OUTPATIENT)
Dept: SURGERY | Facility: HOSPITAL | Age: 32
End: 2022-03-11
Payer: COMMERCIAL

## 2022-03-11 ENCOUNTER — ANESTHESIA EVENT (OUTPATIENT)
Dept: SURGERY | Facility: HOSPITAL | Age: 32
End: 2022-03-11
Payer: COMMERCIAL

## 2022-03-11 ENCOUNTER — HOSPITAL ENCOUNTER (OUTPATIENT)
Facility: HOSPITAL | Age: 32
Discharge: HOME | End: 2022-03-11
Attending: SURGERY | Admitting: SURGERY
Payer: COMMERCIAL

## 2022-03-11 VITALS
OXYGEN SATURATION: 98 % | HEIGHT: 77 IN | RESPIRATION RATE: 20 BRPM | HEART RATE: 61 BPM | TEMPERATURE: 98 F | SYSTOLIC BLOOD PRESSURE: 131 MMHG | WEIGHT: 241.2 LBS | BODY MASS INDEX: 28.48 KG/M2 | DIASTOLIC BLOOD PRESSURE: 79 MMHG

## 2022-03-11 DIAGNOSIS — G89.18 POSTOPERATIVE PAIN: Primary | ICD-10-CM

## 2022-03-11 PROCEDURE — 258N000003 HC RX IP 258 OP 636: Performed by: ANESTHESIOLOGY

## 2022-03-11 PROCEDURE — 710N000012 HC RECOVERY PHASE 2, PER MINUTE: Performed by: SURGERY

## 2022-03-11 PROCEDURE — 710N000009 HC RECOVERY PHASE 1, LEVEL 1, PER MIN: Performed by: SURGERY

## 2022-03-11 PROCEDURE — 250N000011 HC RX IP 250 OP 636: Performed by: NURSE ANESTHETIST, CERTIFIED REGISTERED

## 2022-03-11 PROCEDURE — 49652 PR LAP VENT/ABD HERNIA REPAIR: CPT | Performed by: SURGERY

## 2022-03-11 PROCEDURE — 250N000009 HC RX 250: Performed by: NURSE ANESTHETIST, CERTIFIED REGISTERED

## 2022-03-11 PROCEDURE — 250N000009 HC RX 250: Performed by: SURGERY

## 2022-03-11 PROCEDURE — 360N000080 HC SURGERY LEVEL 7, PER MIN: Performed by: SURGERY

## 2022-03-11 PROCEDURE — 278N000051 HC OR IMPLANT GENERAL: Performed by: SURGERY

## 2022-03-11 PROCEDURE — 250N000013 HC RX MED GY IP 250 OP 250 PS 637: Performed by: ANESTHESIOLOGY

## 2022-03-11 PROCEDURE — S2900 ROBOTIC SURGICAL SYSTEM: HCPCS | Performed by: SURGERY

## 2022-03-11 PROCEDURE — 272N000001 HC OR GENERAL SUPPLY STERILE: Performed by: SURGERY

## 2022-03-11 PROCEDURE — 370N000017 HC ANESTHESIA TECHNICAL FEE, PER MIN: Performed by: SURGERY

## 2022-03-11 PROCEDURE — 250N000011 HC RX IP 250 OP 636: Performed by: SURGERY

## 2022-03-11 PROCEDURE — 250N000011 HC RX IP 250 OP 636: Performed by: ANESTHESIOLOGY

## 2022-03-11 PROCEDURE — 250N000025 HC SEVOFLURANE, PER MIN: Performed by: SURGERY

## 2022-03-11 PROCEDURE — 999N000141 HC STATISTIC PRE-PROCEDURE NURSING ASSESSMENT: Performed by: SURGERY

## 2022-03-11 DEVICE — THE OVITEX® REINFORCED TISSUE MATRIX DEVICE IS A STERILE REINFORCED MATRIX DEVICE COMPRISED OF NON-DERMAL SOURCED LAYERS OF EXTRACELLULAR MATRIX (ECM) (OVINE (SHEEP) DERIVED ECM), PAIRED WITH A MINIMAL SUTURE FOOTPRINT. OVITEX REINFORCED TISSUE MATRIX DEVICE PROVIDES AN ALTERNATIVE DEVICE SOLUTION TO PERMANENT SYNTHETIC, RESORBABLE/BIOABSORBABLE SYNTHETIC, AND BIOLOGIC MESHES. THE DEVICE REMODELS AND HELPS REGENERATE TISSUE WHILE REINFORCING THE REPAIR AND PROVIDING STRUCTURAL SUPPORT. REINFORCED TISSUE MATRICES ENHANCE PERFORMANCE WHILE MAINTAINING COMPATIBILITY WITH THE BODY. THE OVITEX REINFORCED TISSUE MATRIX DEVICE IS USED FOR HERNIA REPAIR AND ABDOMINAL WALL RECONSTRUCTION.
Type: IMPLANTABLE DEVICE | Site: ABDOMEN | Status: FUNCTIONAL
Brand: OVITEX LPR REINFORCED BIOSCAFFOLD

## 2022-03-11 RX ORDER — MAGNESIUM SULFATE 4 G/50ML
4 INJECTION INTRAVENOUS ONCE
Status: COMPLETED | OUTPATIENT
Start: 2022-03-11 | End: 2022-03-11

## 2022-03-11 RX ORDER — NALOXONE HYDROCHLORIDE 1 MG/ML
0.2 INJECTION INTRAMUSCULAR; INTRAVENOUS; SUBCUTANEOUS
Status: DISCONTINUED | OUTPATIENT
Start: 2022-03-11 | End: 2022-03-11 | Stop reason: HOSPADM

## 2022-03-11 RX ORDER — LIDOCAINE HYDROCHLORIDE 20 MG/ML
INJECTION, SOLUTION INFILTRATION; PERINEURAL PRN
Status: DISCONTINUED | OUTPATIENT
Start: 2022-03-11 | End: 2022-03-11

## 2022-03-11 RX ORDER — LIDOCAINE 40 MG/G
CREAM TOPICAL
Status: DISCONTINUED | OUTPATIENT
Start: 2022-03-11 | End: 2022-03-11 | Stop reason: HOSPADM

## 2022-03-11 RX ORDER — ONDANSETRON 2 MG/ML
4 INJECTION INTRAMUSCULAR; INTRAVENOUS EVERY 30 MIN PRN
Status: DISCONTINUED | OUTPATIENT
Start: 2022-03-11 | End: 2022-03-11 | Stop reason: HOSPADM

## 2022-03-11 RX ORDER — SODIUM CHLORIDE, SODIUM LACTATE, POTASSIUM CHLORIDE, CALCIUM CHLORIDE 600; 310; 30; 20 MG/100ML; MG/100ML; MG/100ML; MG/100ML
INJECTION, SOLUTION INTRAVENOUS CONTINUOUS
Status: DISCONTINUED | OUTPATIENT
Start: 2022-03-11 | End: 2022-03-11 | Stop reason: HOSPADM

## 2022-03-11 RX ORDER — CEFAZOLIN SODIUM/WATER 2 G/20 ML
2 SYRINGE (ML) INTRAVENOUS SEE ADMIN INSTRUCTIONS
Status: DISCONTINUED | OUTPATIENT
Start: 2022-03-11 | End: 2022-03-11 | Stop reason: HOSPADM

## 2022-03-11 RX ORDER — KETOROLAC TROMETHAMINE 30 MG/ML
15 INJECTION, SOLUTION INTRAMUSCULAR; INTRAVENOUS ONCE
Status: COMPLETED | OUTPATIENT
Start: 2022-03-11 | End: 2022-03-11

## 2022-03-11 RX ORDER — OXYCODONE HYDROCHLORIDE 5 MG/1
5 TABLET ORAL EVERY 4 HOURS PRN
Status: DISCONTINUED | OUTPATIENT
Start: 2022-03-11 | End: 2022-03-11 | Stop reason: HOSPADM

## 2022-03-11 RX ORDER — FENTANYL CITRATE 50 UG/ML
25 INJECTION, SOLUTION INTRAMUSCULAR; INTRAVENOUS EVERY 5 MIN PRN
Status: DISCONTINUED | OUTPATIENT
Start: 2022-03-11 | End: 2022-03-11 | Stop reason: HOSPADM

## 2022-03-11 RX ORDER — ONDANSETRON 4 MG/1
4 TABLET, ORALLY DISINTEGRATING ORAL EVERY 30 MIN PRN
Status: DISCONTINUED | OUTPATIENT
Start: 2022-03-11 | End: 2022-03-11 | Stop reason: HOSPADM

## 2022-03-11 RX ORDER — HYDROCODONE BITARTRATE AND ACETAMINOPHEN 5; 325 MG/1; MG/1
1 TABLET ORAL EVERY 6 HOURS PRN
Qty: 18 TABLET | Refills: 0 | Status: SHIPPED | OUTPATIENT
Start: 2022-03-11 | End: 2022-03-14

## 2022-03-11 RX ORDER — CEFAZOLIN SODIUM/WATER 2 G/20 ML
2 SYRINGE (ML) INTRAVENOUS
Status: COMPLETED | OUTPATIENT
Start: 2022-03-11 | End: 2022-03-11

## 2022-03-11 RX ORDER — DEXAMETHASONE SODIUM PHOSPHATE 10 MG/ML
INJECTION, SOLUTION INTRAMUSCULAR; INTRAVENOUS PRN
Status: DISCONTINUED | OUTPATIENT
Start: 2022-03-11 | End: 2022-03-11

## 2022-03-11 RX ORDER — NALOXONE HYDROCHLORIDE 1 MG/ML
0.4 INJECTION INTRAMUSCULAR; INTRAVENOUS; SUBCUTANEOUS
Status: DISCONTINUED | OUTPATIENT
Start: 2022-03-11 | End: 2022-03-11 | Stop reason: HOSPADM

## 2022-03-11 RX ORDER — ONDANSETRON 2 MG/ML
INJECTION INTRAMUSCULAR; INTRAVENOUS PRN
Status: DISCONTINUED | OUTPATIENT
Start: 2022-03-11 | End: 2022-03-11

## 2022-03-11 RX ORDER — PROPOFOL 10 MG/ML
INJECTION, EMULSION INTRAVENOUS PRN
Status: DISCONTINUED | OUTPATIENT
Start: 2022-03-11 | End: 2022-03-11

## 2022-03-11 RX ORDER — FENTANYL CITRATE 50 UG/ML
INJECTION, SOLUTION INTRAMUSCULAR; INTRAVENOUS PRN
Status: DISCONTINUED | OUTPATIENT
Start: 2022-03-11 | End: 2022-03-11

## 2022-03-11 RX ORDER — DEXMEDETOMIDINE HYDROCHLORIDE 4 UG/ML
INJECTION, SOLUTION INTRAVENOUS PRN
Status: DISCONTINUED | OUTPATIENT
Start: 2022-03-11 | End: 2022-03-11

## 2022-03-11 RX ORDER — FENTANYL CITRATE 50 UG/ML
25 INJECTION, SOLUTION INTRAMUSCULAR; INTRAVENOUS
Status: DISCONTINUED | OUTPATIENT
Start: 2022-03-11 | End: 2022-03-11 | Stop reason: HOSPADM

## 2022-03-11 RX ORDER — DOCUSATE SODIUM 100 MG/1
100 CAPSULE, LIQUID FILLED ORAL 2 TIMES DAILY
Qty: 30 CAPSULE | Refills: 0 | Status: SHIPPED | OUTPATIENT
Start: 2022-03-11 | End: 2022-05-12

## 2022-03-11 RX ORDER — LIDOCAINE HYDROCHLORIDE AND EPINEPHRINE 10; 10 MG/ML; UG/ML
INJECTION, SOLUTION INFILTRATION; PERINEURAL PRN
Status: DISCONTINUED | OUTPATIENT
Start: 2022-03-11 | End: 2022-03-11 | Stop reason: HOSPADM

## 2022-03-11 RX ADMIN — LIDOCAINE HYDROCHLORIDE 2 ML: 20 INJECTION, SOLUTION INFILTRATION; PERINEURAL at 11:34

## 2022-03-11 RX ADMIN — Medication 2 G: at 11:43

## 2022-03-11 RX ADMIN — DEXMEDETOMIDINE 20 MCG: 100 INJECTION, SOLUTION, CONCENTRATE INTRAVENOUS at 11:50

## 2022-03-11 RX ADMIN — OXYCODONE HYDROCHLORIDE 5 MG: 5 TABLET ORAL at 14:04

## 2022-03-11 RX ADMIN — SODIUM CHLORIDE, POTASSIUM CHLORIDE, SODIUM LACTATE AND CALCIUM CHLORIDE: 600; 310; 30; 20 INJECTION, SOLUTION INTRAVENOUS at 12:13

## 2022-03-11 RX ADMIN — DEXAMETHASONE SODIUM PHOSPHATE 10 MG: 10 INJECTION, SOLUTION INTRAMUSCULAR; INTRAVENOUS at 11:45

## 2022-03-11 RX ADMIN — HYDROMORPHONE HYDROCHLORIDE 0.2 MG: 1 INJECTION, SOLUTION INTRAMUSCULAR; INTRAVENOUS; SUBCUTANEOUS at 14:00

## 2022-03-11 RX ADMIN — FENTANYL CITRATE 25 MCG: 50 INJECTION, SOLUTION INTRAMUSCULAR; INTRAVENOUS at 13:20

## 2022-03-11 RX ADMIN — ROCURONIUM BROMIDE 20 MG: 50 INJECTION, SOLUTION INTRAVENOUS at 12:11

## 2022-03-11 RX ADMIN — MIDAZOLAM 2 MG: 1 INJECTION INTRAMUSCULAR; INTRAVENOUS at 11:28

## 2022-03-11 RX ADMIN — FENTANYL CITRATE 25 MCG: 50 INJECTION, SOLUTION INTRAMUSCULAR; INTRAVENOUS at 13:26

## 2022-03-11 RX ADMIN — ONDANSETRON 4 MG: 2 INJECTION INTRAMUSCULAR; INTRAVENOUS at 12:31

## 2022-03-11 RX ADMIN — FENTANYL CITRATE 25 MCG: 50 INJECTION, SOLUTION INTRAMUSCULAR; INTRAVENOUS at 13:31

## 2022-03-11 RX ADMIN — FENTANYL CITRATE 25 MCG: 50 INJECTION, SOLUTION INTRAMUSCULAR; INTRAVENOUS at 13:36

## 2022-03-11 RX ADMIN — PROPOFOL 200 MG: 10 INJECTION, EMULSION INTRAVENOUS at 11:34

## 2022-03-11 RX ADMIN — SODIUM CHLORIDE, POTASSIUM CHLORIDE, SODIUM LACTATE AND CALCIUM CHLORIDE: 600; 310; 30; 20 INJECTION, SOLUTION INTRAVENOUS at 10:29

## 2022-03-11 RX ADMIN — SUGAMMADEX 200 MG: 100 INJECTION, SOLUTION INTRAVENOUS at 12:54

## 2022-03-11 RX ADMIN — MAGNESIUM SULFATE HEPTAHYDRATE 4 G: 80 INJECTION, SOLUTION INTRAVENOUS at 10:34

## 2022-03-11 RX ADMIN — FENTANYL CITRATE 50 MCG: 50 INJECTION, SOLUTION INTRAMUSCULAR; INTRAVENOUS at 13:01

## 2022-03-11 RX ADMIN — FENTANYL CITRATE 100 MCG: 50 INJECTION, SOLUTION INTRAMUSCULAR; INTRAVENOUS at 11:34

## 2022-03-11 RX ADMIN — FENTANYL CITRATE 50 MCG: 50 INJECTION, SOLUTION INTRAMUSCULAR; INTRAVENOUS at 12:51

## 2022-03-11 RX ADMIN — ROCURONIUM BROMIDE 40 MG: 50 INJECTION, SOLUTION INTRAVENOUS at 11:34

## 2022-03-11 RX ADMIN — FENTANYL CITRATE 25 MCG: 50 INJECTION, SOLUTION INTRAMUSCULAR; INTRAVENOUS at 14:53

## 2022-03-11 RX ADMIN — HYDROMORPHONE HYDROCHLORIDE 0.2 MG: 1 INJECTION, SOLUTION INTRAMUSCULAR; INTRAVENOUS; SUBCUTANEOUS at 13:50

## 2022-03-11 RX ADMIN — HYDROMORPHONE HYDROCHLORIDE 0.2 MG: 1 INJECTION, SOLUTION INTRAMUSCULAR; INTRAVENOUS; SUBCUTANEOUS at 13:55

## 2022-03-11 RX ADMIN — HYDROMORPHONE HYDROCHLORIDE 0.2 MG: 1 INJECTION, SOLUTION INTRAMUSCULAR; INTRAVENOUS; SUBCUTANEOUS at 13:40

## 2022-03-11 RX ADMIN — HYDROMORPHONE HYDROCHLORIDE 1 MG: 1 INJECTION, SOLUTION INTRAMUSCULAR; INTRAVENOUS; SUBCUTANEOUS at 11:48

## 2022-03-11 RX ADMIN — KETOROLAC TROMETHAMINE 15 MG: 30 INJECTION, SOLUTION INTRAMUSCULAR; INTRAVENOUS at 14:18

## 2022-03-11 RX ADMIN — HYDROMORPHONE HYDROCHLORIDE 0.2 MG: 1 INJECTION, SOLUTION INTRAMUSCULAR; INTRAVENOUS; SUBCUTANEOUS at 13:45

## 2022-03-11 NOTE — INTERVAL H&P NOTE
I have reviewed the surgical (or preoperative) H&P that is linked to this encounter, and examined the patient. There are no significant changes    -To the OR today for robotic assisted laparoscopic repair of helical hernia, with mesh, possible open.   -The risks and benefits of the procedure were explained detail to the patient. The risks include infection, bleeding, damage to the surrounding structures. Patient verbalized understanding provided consent to undergo the procedure above.

## 2022-03-11 NOTE — ANESTHESIA PROCEDURE NOTES
Airway       Patient location during procedure: OR       Procedure Start/Stop Times: 3/11/2022 11:37 AM  Staff -        Anesthesiologist:  James Moctezuma MD       CRNA: Siobhan Amanda APRN CRNA       Performed By: CRNA  Consent for Airway        Urgency: elective  Indications and Patient Condition       Indications for airway management: kathi-procedural       Induction type:intravenous       Mask difficulty assessment: 1 - vent by mask    Final Airway Details       Final airway type: endotracheal airway       Successful airway: ETT - single  Endotracheal Airway Details        ETT size (mm): 7.5       Cuffed: yes       Successful intubation technique: direct laryngoscopy       DL Blade Type: Tejada 2       Grade View of Cords: 1       Adjucts: stylet and tooth guard       Position: Right       Measured from: lips       Secured at (cm): 22       Bite block used: None    Post intubation assessment        Placement verified by: capnometry, equal breath sounds and chest rise        Number of attempts at approach: 1       Number of other approaches attempted: 0       Secured with: silk tape       Ease of procedure: easy       Dentition: Intact and Unchanged

## 2022-03-11 NOTE — OP NOTE
[facility]    Operative Note    Pre-operative diagnosis: Umbilical hernia without obstruction and without gangrene [K42.9]  Post-operative diagnosis Same as pre-operative diagnosis    Procedure: Procedure(s):  HERNIORRHAPHY, UMBILICAL, ROBOT-ASSISTED, LAPAROSCOPIC, USING DA KIANNA XI  Surgeon: Surgeon(s) and Role:     * Shay Nguyen, DO - Primary  Anesthesia: General   Estimated Blood Loss: 5 mL from 3/11/2022 11:28 AM to 3/11/2022  1:02 PM      Drains: None  Specimens: * No specimens in log *  Findings:     -3 cm umbilical hernia  -Placement of 9 x 9 ovitex mesh  -Possible small left-sided inguinal hernia    Complications: None.  Implants:   Implant Name Type Inv. Item Serial No.  Lot No. LRB No. Used Action   OviTex LPR, Permanent Polymer, Size 9 x 9cm     [id number] N/A 1 Implanted     Indication: 31-year-old male who presented to the general surgery clinic with complaints of abdominal pain around the umbilical site.  Patient was found to have an umbilical hernia.  I offered the patient a procedure of robotic assisted laparoscopic repair of umbilical hernia with mesh placement.The risks and benefits of the procedure were explained detail to the patient. The risks include infection, bleeding, damage to the surrounding structures. Patient verbalized understanding provided consent to undergo the procedure above.    Prior to proceeding with surgery, the patient stated that he had some left inguinal pain that was spontaneous and he felt like something had reduced in the left groin.  I explained to the patient that I would not be able to repair a left inguinal hernia today however I could evaluate inside to see if I can identify a left inguinal hernia.    Procedure: Patient was brought to the operating room and placed on the operating table in a supine position.  Patient's bilateral upper extremities were tucked next the patient's body in the usual fashion.  The patient then  underwent sedation and intubation by the anesthesia team.  The patient's abdomen was prepped and draped in usual sterile fashion.  Prior to initiate the procedure, a timeout was completed.  All present were in agreement.    1% lidocaine with epinephrine was injected over Lopez's point in the left upper quadrant.  A 15 blade was used to make a skin nick.  The Veress needle was inserted into the patient's abdomen.  A saline drop test was completed.  This was normal.  The patient then had insufflation of the patient's abdomen.  Once sufficient insufflation was obtained, x3 8 mm ports were placed in the right upper quadrant, right middle quadrant, and right lower quadrant.  All ports were placed under direct visualization.  Supports were obtained, a general survey was completed.  I could identify that there is no injury upon during the abdomen.  The Veress needle did not also cause any injuries.  I identified the patient did have an umbilical hernia.  Umbilical hernia was approximately 3 cm in size.  The patient did not have a right-sided inguinal hernia.  It is possible that the patient could have a developing left-sided inguinal hernia.     The robot was then docked.  All of the instrumentations were then inserted into the patient's abdomen.  Using the scissors and electrocautery, I was able to dissect the peritoneum off of the umbilical hernia in a circumferential fashion.  I was able to reduce the hernia sac.  A 2 0 strata fix suture was then used to close the hernia defect in a running fashion.  The 9 x 9 cm ovitex mesh was inserted into the patient's abdomen.  It was then parachuted up to the anterior abdomen in order to cover the hernia.  Another 2 0 strata fix suture was then used to anchor the mesh into the anterior abdominal wall in an IPOM fashion around the perimeter of the mesh.  The mesh was in position, I took another general survey.  I could identify that there was good hemostasis.    The needles were  then removed.  The hernia sac was also removed.  The trochars were all removed under direct visualization.  I could identify there was no bleeding from the trocar sites.  All of the trocar sites were then closed using 4-0 Vicryl stitches.  The skin incisions were reinforced using surgical glue.  Patient tolerated procedure well no complications.  At the end of the procedure a final count was completed.  I was told that all sharps, sponges, instruments were accounted for.  Patient was extubated and brought to the PACU in stable condition.                          Shay Nguyen DO  General Surgeon  [facility]  Surgery [facility] - [address] [address]?  Office: [phone]  Employed by - [employer] [facility] [employer]  Pager: [phone]

## 2022-03-11 NOTE — ANESTHESIA POSTPROCEDURE EVALUATION
Patient: Henrry Gilliam    Procedure: Procedure(s):  HERNIORRHAPHY, UMBILICAL, ROBOT-ASSISTED, LAPAROSCOPIC, USING DA KIANNA XI       Anesthesia Type:  General    Note:  Disposition: Outpatient   Postop Pain Control: Uneventful            Sign Out: Well controlled pain   PONV: No   Neuro/Psych: Uneventful            Sign Out: Acceptable/Baseline neuro status   Airway/Respiratory: Uneventful            Sign Out: Acceptable/Baseline resp. status   CV/Hemodynamics: Uneventful            Sign Out: Acceptable CV status; No obvious hypovolemia; No obvious fluid overload   Other NRE: NONE   DID A NON-ROUTINE EVENT OCCUR? No           Last vitals:  Vitals Value Taken Time   /69 03/11/22 1430   Temp     Pulse 66 03/11/22 1435   Resp 16 03/11/22 1430   SpO2 99 % 03/11/22 1435   Vitals shown include unvalidated device data.    Electronically Signed By: James Moctezuma MD  March 11, 2022  3:20 PM

## 2022-03-11 NOTE — ANESTHESIA PREPROCEDURE EVALUATION
Anesthesia Pre-Procedure Evaluation    Patient: Henrry Gilliam   MRN: [id number] : 1990        Procedure : Procedure(s):  HERNIORRHAPHY, UMBILICAL, ROBOT-ASSISTED, LAPAROSCOPIC, USING DA KIANNA XI          History reviewed. No pertinent past medical history.   Past Surgical History:   Procedure Laterality Date     ANTERIOR CRUCIATE LIGAMENT REPAIR Right ,       No Known Allergies   Social History     Tobacco Use     Smoking status: Never Smoker     Smokeless tobacco: Never Used   Substance Use Topics     Alcohol use: Yes     Alcohol/week: 5.0 standard drinks     Types: 5 Cans of beer per week     Comment: one drink per week      Wt Readings from Last 1 Encounters:   22 109.4 kg (241 lb 3.2 oz)        Anesthesia Evaluation   Pt has had prior anesthetic.         ROS/MED HX  ENT/Pulmonary:  - neg pulmonary ROS     Neurologic:  - neg neurologic ROS     Cardiovascular:  - neg cardiovascular ROS  (-) murmur and wheezes   METS/Exercise Tolerance: >4 METS    Hematologic:  - neg hematologic  ROS     Musculoskeletal:  - neg musculoskeletal ROS     GI/Hepatic:  - neg GI/hepatic ROS     Renal/Genitourinary:  - neg Renal ROS     Endo:  - neg endo ROS     Psychiatric/Substance Use:  - neg psychiatric ROS     Infectious Disease:  - neg infectious disease ROS     Malignancy:  - neg malignancy ROS     Other:  - neg other ROS          Physical Exam    Airway  airway exam normal      Mallampati: I   TM distance: > 3 FB   Neck ROM: full   Mouth opening: > 3 cm    Respiratory Devices and Support         Dental  no notable dental history         Cardiovascular          Rhythm and rate: regular and normal (-) no murmur    Pulmonary           breath sounds clear to auscultation   (-) no wheezes        OUTSIDE LABS:  CBC: No results found for: WBC, HGB, HCT, PLT  BMP:   Lab Results   Component Value Date     2020    POTASSIUM 4.1 2020    CHLORIDE 104 2020    CO2 25 2020    BUN 16  01/07/2020    CR 1.10 01/07/2020    GLC 86 01/07/2020    GLC 86 12/05/2018     COAGS: No results found for: PTT, INR, FIBR  POC: No results found for: BGM, HCG, HCGS  HEPATIC: No results found for: ALBUMIN, PROTTOTAL, ALT, AST, GGT, ALKPHOS, BILITOTAL, BILIDIRECT, [place]  OTHER:   Lab Results   Component Value Date    A1C 5.2 02/05/2020    ANTONINO 9.9 01/07/2020       Anesthesia Plan    ASA Status:  1   NPO Status:  NPO Appropriate    Anesthesia Type: General.     - Airway: ETT   Induction: Intravenous, Propofol.   Maintenance: Inhalation.        Consents    Anesthesia Plan(s) and associated risks, benefits, and realistic alternatives discussed. Questions answered and patient/representative(s) expressed understanding.     - Discussed: Risks, Benefits and Alternatives for BOTH SEDATION and the PROCEDURE were discussed     - Discussed with:  Patient      - Patient is DNR/DNI Status: No         Postoperative Care    Pain management: IV analgesics, Oral pain medications.   PONV prophylaxis: Ondansetron (or other 5HT-3), Dexamethasone or Solumedrol     Comments:                James Moctezuma MD

## 2022-03-11 NOTE — ANESTHESIA CARE TRANSFER NOTE
Patient: Henrry Gilliam    Procedure: Procedure(s):  HERNIORRHAPHY, UMBILICAL, ROBOT-ASSISTED, LAPAROSCOPIC, USING DA KIANNA XI       Diagnosis: Umbilical hernia without obstruction and without gangrene [K42.9]  Diagnosis Additional Information: No value filed.    Anesthesia Type:   General     Note:    Oropharynx: oropharynx clear of all foreign objects  Level of Consciousness: awake  Oxygen Supplementation: face mask  Level of Supplemental Oxygen (L/min / FiO2): 6L/min/100%  Independent Airway: airway patency satisfactory and stable  Dentition: dentition unchanged  Vital Signs Stable: post-procedure vital signs reviewed and stable  Report to RN Given: handoff report given  Patient transferred to: PACU    Handoff Report: Identifed the Patient, Identified the Reponsible Provider, Reviewed the pertinent medical history, Discussed the surgical course, Reviewed Intra-OP anesthesia mangement and issues during anesthesia, Set expectations for post-procedure period and Allowed opportunity for questions and acknowledgement of understanding      Vitals:  Vitals Value Taken Time   /74 03/11/22 1306   Temp     Pulse 79 03/11/22 1308   Resp 18 03/11/22 1308   SpO2 100 % 03/11/22 1308   Vitals shown include unvalidated device data.    Electronically Signed By: YUDITH Bell CRNA  March 11, 2022  1:10 PM

## 2022-03-11 NOTE — INTERVAL H&P NOTE
I have reviewed the surgical (or preoperative) H&P that is linked to this encounter, and examined the patient. There are no significant changes    -Robotic assisted laparoscopic repair of umbilical hernia, possible open, possible mesh.

## 2022-03-18 RX ORDER — HYDROCODONE BITARTRATE AND ACETAMINOPHEN 5; 325 MG/1; MG/1
1 TABLET ORAL EVERY 6 HOURS PRN
Qty: 18 TABLET | Refills: 0 | Status: SHIPPED | OUTPATIENT
Start: 2022-03-18 | End: 2022-03-21

## 2022-05-12 ENCOUNTER — VIRTUAL VISIT (OUTPATIENT)
Dept: FAMILY MEDICINE | Facility: CLINIC | Age: 32
End: 2022-05-12
Payer: COMMERCIAL

## 2022-05-12 DIAGNOSIS — F90.2 ADHD (ATTENTION DEFICIT HYPERACTIVITY DISORDER), COMBINED TYPE: Primary | ICD-10-CM

## 2022-05-12 DIAGNOSIS — F33.1 MODERATE EPISODE OF RECURRENT MAJOR DEPRESSIVE DISORDER (H): ICD-10-CM

## 2022-05-12 DIAGNOSIS — F41.9 ANXIETY: ICD-10-CM

## 2022-05-12 PROCEDURE — 99214 OFFICE O/P EST MOD 30 MIN: CPT | Mod: 95 | Performed by: FAMILY MEDICINE

## 2022-05-12 RX ORDER — DEXTROAMPHETAMINE SACCHARATE, AMPHETAMINE ASPARTATE, DEXTROAMPHETAMINE SULFATE AND AMPHETAMINE SULFATE 5; 5; 5; 5 MG/1; MG/1; MG/1; MG/1
20 TABLET ORAL 2 TIMES DAILY
Qty: 60 TABLET | Refills: 0 | Status: SHIPPED | OUTPATIENT
Start: 2022-07-13 | End: 2022-08-12

## 2022-05-12 RX ORDER — DEXTROAMPHETAMINE SACCHARATE, AMPHETAMINE ASPARTATE, DEXTROAMPHETAMINE SULFATE AND AMPHETAMINE SULFATE 5; 5; 5; 5 MG/1; MG/1; MG/1; MG/1
20 TABLET ORAL 2 TIMES DAILY
Qty: 60 TABLET | Refills: 0 | Status: SHIPPED | OUTPATIENT
Start: 2022-06-12 | End: 2022-07-12

## 2022-05-12 RX ORDER — DEXTROAMPHETAMINE SACCHARATE, AMPHETAMINE ASPARTATE, DEXTROAMPHETAMINE SULFATE AND AMPHETAMINE SULFATE 5; 5; 5; 5 MG/1; MG/1; MG/1; MG/1
20 TABLET ORAL 2 TIMES DAILY
Qty: 60 TABLET | Refills: 0 | Status: SHIPPED | OUTPATIENT
Start: 2022-05-12 | End: 2022-06-11

## 2022-05-12 RX ORDER — DEXTROAMPHETAMINE SACCHARATE, AMPHETAMINE ASPARTATE, DEXTROAMPHETAMINE SULFATE AND AMPHETAMINE SULFATE 5; 5; 5; 5 MG/1; MG/1; MG/1; MG/1
20 TABLET ORAL 2 TIMES DAILY
COMMUNITY
End: 2022-08-15

## 2022-05-12 NOTE — PROGRESS NOTES
Henrry Gilliam is a 31 year old  Male  for a medication check and ADHD follow up.      Answers for HPI/ROS submitted by the patient on 5/12/2022  How many days per week do you miss taking your medication?: 0  What is the reason for your visit today?: Medication Check up    CURRENT CONCERNS:  Adderall refill      Review of past medical history:     ADHD (attention deficit hyperactivity disorder), combined type  Moderate episode of recurrent major depressive disorder (H)  Anxiety   Henrry is a 31 year old who is being evaluated via a billable video visit.      How would you like to obtain your AVS? MyChart  If the video visit is dropped, the invitation should be resent by: Text to cell phone:    Will anyone else be joining your video visit? No      Video Start Time: 1:42 PM    Assessment & Plan     ADHD (attention deficit hyperactivity disorder), combined type  Symptoms continue to be well controlled on Adderall 20 mg twice daily.  Recently started noticing some abdominal discomfort after taking the morning dose.  He is going to monitor this to see if he can identify any triggers.  Otherwise, he may continue this at the current dose and follow-up in 3 months or sooner if needed.  - amphetamine-dextroamphetamine (ADDERALL) 20 MG tablet; Take 1 tablet (20 mg) by mouth 2 times daily  - amphetamine-dextroamphetamine (ADDERALL) 20 MG tablet; Take 1 tablet (20 mg) by mouth 2 times daily  - amphetamine-dextroamphetamine (ADDERALL) 20 MG tablet; Take 1 tablet (20 mg) by mouth 2 times daily    Moderate episode of recurrent major depressive disorder (H)  Depression symptoms are stable without medication at this time.    Anxiety  Anxiety symptoms are stable without medication at this time.                   Return in about 3 months (around 8/12/2022) for ADHD Follow Up.    Guru Augustine, [facility]   Henrry is a 31 year old who presents for the following health issues     HPI            He has a history of ADHD combined type, depression and anxiety.  He feels like mental health symptoms have been very stable and he is doing well on Adderall 20 mg twice daily.  Recently he has noticed some abdominal discomfort after taking the morning dose.  He denies nausea or vomiting.  He has not had symptoms like this in the past and he has been on this medication for fairly long time.  Appetite, sleep and mood have been good.    Review of Systems   Constitutional, HEENT, cardiovascular, pulmonary, gi and gu systems are negative, except as otherwise noted.      Objective           Vitals:  No vitals were obtained today due to virtual visit.    Physical Exam   GENERAL: Healthy, alert and no distress  EYES: Eyes grossly normal to inspection.  No discharge or erythema, or obvious scleral/conjunctival abnormalities.  RESP: No audible wheeze, cough, or visible cyanosis.  No visible retractions or increased work of breathing.    SKIN: Visible skin clear. No significant rash, abnormal pigmentation or lesions.  NEURO: Cranial nerves grossly intact.  Mentation and speech appropriate for age.  PSYCH: Mentation appears normal, affect normal/bright, judgement and insight intact, normal speech and appearance well-groomed.                Video-Visit Details    Type of service:  Video Visit    Video End Time:1:52 PM    Originating Location (pt. Location): Home    Distant Location (provider location):  [facility]     Platform used for Video Visit: [employer]  CURRENT PRESCRIPTIONS:  No change in medication. Continue on current Rx.

## 2022-07-18 ENCOUNTER — MYC MEDICAL ADVICE (OUTPATIENT)
Dept: FAMILY MEDICINE | Facility: CLINIC | Age: 32
End: 2022-07-18

## 2022-07-18 ENCOUNTER — MYC REFILL (OUTPATIENT)
Dept: FAMILY MEDICINE | Facility: CLINIC | Age: 32
End: 2022-07-18

## 2022-07-18 DIAGNOSIS — F90.2 ADHD (ATTENTION DEFICIT HYPERACTIVITY DISORDER), COMBINED TYPE: ICD-10-CM

## 2022-07-18 RX ORDER — DEXTROAMPHETAMINE SACCHARATE, AMPHETAMINE ASPARTATE, DEXTROAMPHETAMINE SULFATE AND AMPHETAMINE SULFATE 5; 5; 5; 5 MG/1; MG/1; MG/1; MG/1
20 TABLET ORAL 2 TIMES DAILY
Qty: 60 TABLET | Refills: 0 | Status: CANCELLED | OUTPATIENT
Start: 2022-07-18

## 2022-08-15 ENCOUNTER — VIRTUAL VISIT (OUTPATIENT)
Dept: FAMILY MEDICINE | Facility: CLINIC | Age: 32
End: 2022-08-15
Payer: COMMERCIAL

## 2022-08-15 DIAGNOSIS — Z11.3 SCREENING FOR STDS (SEXUALLY TRANSMITTED DISEASES): ICD-10-CM

## 2022-08-15 DIAGNOSIS — F90.2 ADHD (ATTENTION DEFICIT HYPERACTIVITY DISORDER), COMBINED TYPE: Primary | ICD-10-CM

## 2022-08-15 PROCEDURE — 99213 OFFICE O/P EST LOW 20 MIN: CPT | Mod: 95 | Performed by: FAMILY MEDICINE

## 2022-08-15 RX ORDER — DEXTROAMPHETAMINE SACCHARATE, AMPHETAMINE ASPARTATE, DEXTROAMPHETAMINE SULFATE AND AMPHETAMINE SULFATE 5; 5; 5; 5 MG/1; MG/1; MG/1; MG/1
20 TABLET ORAL 2 TIMES DAILY
Qty: 60 TABLET | Refills: 0 | Status: SHIPPED | OUTPATIENT
Start: 2022-08-15 | End: 2022-08-24

## 2022-08-15 RX ORDER — DEXTROAMPHETAMINE SACCHARATE, AMPHETAMINE ASPARTATE, DEXTROAMPHETAMINE SULFATE AND AMPHETAMINE SULFATE 5; 5; 5; 5 MG/1; MG/1; MG/1; MG/1
20 TABLET ORAL 2 TIMES DAILY
Qty: 60 TABLET | Refills: 0 | Status: SHIPPED | OUTPATIENT
Start: 2022-10-16 | End: 2022-08-24

## 2022-08-15 RX ORDER — DEXTROAMPHETAMINE SACCHARATE, AMPHETAMINE ASPARTATE, DEXTROAMPHETAMINE SULFATE AND AMPHETAMINE SULFATE 5; 5; 5; 5 MG/1; MG/1; MG/1; MG/1
20 TABLET ORAL 2 TIMES DAILY
Qty: 60 TABLET | Refills: 0 | Status: SHIPPED | OUTPATIENT
Start: 2022-09-15 | End: 2022-08-24

## 2022-08-15 NOTE — PROGRESS NOTES
Henrry is a 32 year old who is being evaluated via a billable video visit.      How would you like to obtain your AVS? Beverleyhart  If the video visit is dropped, the invitation should be resent by: Send to e-mail at: [email or web address]  Will anyone else be joining your video visit? No          Assessment & Plan     ADHD (attention deficit hyperactivity disorder), combined type  Symptoms continue to be well controlled on Adderall 20 mg twice daily.  - amphetamine-dextroamphetamine (ADDERALL) 20 MG tablet; Take 1 tablet (20 mg) by mouth 2 times daily for 30 days  - amphetamine-dextroamphetamine (ADDERALL) 20 MG tablet; Take 1 tablet (20 mg) by mouth 2 times daily for 30 days  - amphetamine-dextroamphetamine (ADDERALL) 20 MG tablet; Take 1 tablet (20 mg) by mouth 2 times daily for 30 days    Screening for STDs (sexually transmitted diseases)  - HIV Antigen Antibody Combo; Future  - Chlamydia & Gonorrhea by PCR, [facility] Collect; Future  - Treponema Abs w Reflex to RPR and Titer; Future                   Return in about 3 months (around 11/15/2022) for ADHD Follow Up.    Guru Augustine, [facility]   Henrry is a 32 year old, presenting for the following health issues:  No chief complaint on file.      HPI     ADHD Follow-Up    Date of last ADHD office visit: 5/12/22  Status since last visit: Stable  Taking controlled (daily) medications as prescribed: Yes                       Parent/Patient Concerns with Medications: None  ADHD Medication     Amphetamines Disp Start End     amphetamine-dextroamphetamine (ADDERALL) 20 MG tablet          Sig - Route: Take 20 mg by mouth 2 times daily - Oral    Class: Historical        He has a history of ADHD combined type, depression and anxiety.  He feels like mental health symptoms have been very stable and he is doing well on Adderall 20 mg twice daily.  Recently he has noticed some abdominal discomfort after taking the morning dose.   He denies nausea or vomiting.  He has not had symptoms like this in the past and he has been on this medication for fairly long time.  Appetite, sleep and mood have been good.      Co-Morbid Diagnosis: Depression and Anxiety well controlled without medication.      Medication Benefits:   Controlled symptoms: Attention span, Distractability and Finishing tasks  Uncontrolled Symptoms: None    Medication side effects:  Side effects noted: none  Denies: appetite suppression, weight loss and insomnia              Review of Systems   Constitutional, HEENT, cardiovascular, pulmonary, GI, , musculoskeletal, neuro, skin, endocrine and psych systems are negative, except as otherwise noted.      Objective       Vitals:  No vitals were obtained today due to virtual visit.    Physical Exam   GENERAL: Healthy, alert and no distress  EYES: Eyes grossly normal to inspection.  No discharge or erythema, or obvious scleral/conjunctival abnormalities.  RESP: No audible wheeze, cough, or visible cyanosis.  No visible retractions or increased work of breathing.    SKIN: Visible skin clear. No significant rash, abnormal pigmentation or lesions.  NEURO: Cranial nerves grossly intact.  Mentation and speech appropriate for age.  PSYCH: Mentation appears normal, affect normal/bright, judgement and insight intact, normal speech and appearance well-groomed.                Video-Visit Details    Video Start Time: 9:00 AM    Type of service:  Video Visit    Video End Time:9:12 AM    Originating Location (pt. Location): Home    Distant Location (provider location):  [facility]     Platform used for Video Visit: [employer]    .  ..

## 2022-08-16 ENCOUNTER — LAB (OUTPATIENT)
Dept: LAB | Facility: CLINIC | Age: 32
End: 2022-08-16
Payer: COMMERCIAL

## 2022-08-16 DIAGNOSIS — Z13.1 SCREENING FOR DIABETES MELLITUS: ICD-10-CM

## 2022-08-16 DIAGNOSIS — Z13.220 SCREENING CHOLESTEROL LEVEL: ICD-10-CM

## 2022-08-16 DIAGNOSIS — Z11.3 SCREENING FOR STDS (SEXUALLY TRANSMITTED DISEASES): ICD-10-CM

## 2022-08-16 LAB
ANION GAP SERPL CALCULATED.3IONS-SCNC: 7 MMOL/L (ref 7–15)
BUN SERPL-MCNC: 10.5 MG/DL (ref 6–20)
CALCIUM SERPL-MCNC: 9.7 MG/DL (ref 8.6–10)
CHLORIDE SERPL-SCNC: 104 MMOL/L (ref 98–107)
CHOLEST SERPL-MCNC: 175 MG/DL
CREAT SERPL-MCNC: 1.1 MG/DL (ref 0.67–1.17)
DEPRECATED HCO3 PLAS-SCNC: 27 MMOL/L (ref 22–29)
GFR SERPL CREATININE-BSD FRML MDRD: >90 ML/MIN/1.73M2
GLUCOSE SERPL-MCNC: 97 MG/DL (ref 70–99)
HDLC SERPL-MCNC: 54 MG/DL
HIV 1+2 AB+HIV1 P24 AG SERPL QL IA: NONREACTIVE
LDLC SERPL CALC-MCNC: 106 MG/DL
NONHDLC SERPL-MCNC: 121 MG/DL
POTASSIUM SERPL-SCNC: 4.5 MMOL/L (ref 3.4–5.3)
SODIUM SERPL-SCNC: 138 MMOL/L (ref 136–145)
T PALLIDUM AB SER QL: NONREACTIVE
TRIGL SERPL-MCNC: 73 MG/DL

## 2022-08-16 PROCEDURE — 36415 COLL VENOUS BLD VENIPUNCTURE: CPT

## 2022-08-16 PROCEDURE — 80061 LIPID PANEL: CPT

## 2022-08-16 PROCEDURE — 87591 N.GONORRHOEAE DNA AMP PROB: CPT

## 2022-08-16 PROCEDURE — 87491 CHLMYD TRACH DNA AMP PROBE: CPT

## 2022-08-16 PROCEDURE — 86780 TREPONEMA PALLIDUM: CPT

## 2022-08-16 PROCEDURE — 80048 BASIC METABOLIC PNL TOTAL CA: CPT

## 2022-08-16 PROCEDURE — 87389 HIV-1 AG W/HIV-1&-2 AB AG IA: CPT

## 2022-08-17 LAB
C TRACH DNA SPEC QL PROBE+SIG AMP: NEGATIVE
N GONORRHOEA DNA SPEC QL NAA+PROBE: NEGATIVE

## 2022-08-23 ENCOUNTER — MYC MEDICAL ADVICE (OUTPATIENT)
Dept: FAMILY MEDICINE | Facility: CLINIC | Age: 32
End: 2022-08-23

## 2022-08-23 DIAGNOSIS — F90.2 ADHD (ATTENTION DEFICIT HYPERACTIVITY DISORDER), COMBINED TYPE: Primary | ICD-10-CM

## 2022-08-24 RX ORDER — DEXTROAMPHETAMINE SACCHARATE, AMPHETAMINE ASPARTATE, DEXTROAMPHETAMINE SULFATE AND AMPHETAMINE SULFATE 5; 5; 5; 5 MG/1; MG/1; MG/1; MG/1
20 TABLET ORAL 2 TIMES DAILY
Qty: 180 TABLET | Refills: 0 | Status: SHIPPED | OUTPATIENT
Start: 2022-08-24 | End: 2022-12-13

## 2022-09-11 ENCOUNTER — HEALTH MAINTENANCE LETTER (OUTPATIENT)
Age: 32
End: 2022-09-11

## 2022-11-22 ENCOUNTER — OFFICE VISIT (OUTPATIENT)
Dept: SURGERY | Facility: CLINIC | Age: 32
End: 2022-11-22
Payer: COMMERCIAL

## 2022-11-22 VITALS — SYSTOLIC BLOOD PRESSURE: 112 MMHG | DIASTOLIC BLOOD PRESSURE: 78 MMHG

## 2022-11-22 DIAGNOSIS — R10.32 LEFT GROIN PAIN: Primary | ICD-10-CM

## 2022-11-22 PROCEDURE — 99213 OFFICE O/P EST LOW 20 MIN: CPT | Performed by: SURGERY

## 2022-11-22 RX ORDER — CEFAZOLIN SODIUM/WATER 2 G/20 ML
2 SYRINGE (ML) INTRAVENOUS SEE ADMIN INSTRUCTIONS
Status: CANCELLED | OUTPATIENT
Start: 2022-12-05

## 2022-11-22 RX ORDER — CEFAZOLIN SODIUM/WATER 2 G/20 ML
2 SYRINGE (ML) INTRAVENOUS
Status: CANCELLED | OUTPATIENT
Start: 2022-12-05

## 2022-11-22 NOTE — PROGRESS NOTES
General Surgery Consultation  Henrry Gilliam MRN# [id number]   Age/Sex: 32 year old male YOB: 1990     Reason for consult: 1. Left groin pain            Requesting physician: Guru Augustine DO                   Assessment and Plan:   Assessment:  1.  Left groin pain - I do appreciate a small left sided inguinal hernia on exam.      Plan:  - ordering left groin US to evaluate the contents and size of the hernia.   - will preemptively schedule patient for surgery to preserve spot.  We can discuss proceeding with surgery once ultrasound findings are completed.  -Thank you for allowing me to participate this patient's          Chief Complaint:     Chief Complaint   Patient presents with     Hernia     Hernia follow-up, possible second hernia?        History is obtained from the patient    HPI:   Henrry Gilliam is a 32 year old male who presents to the general surgery clinic today for evaluation of left groin pain.  Patient had left flank pain associated with his umbilical hernia back in March.  Patient now presents with left groin pain.  He would like to be further evaluated to determine whether or not he has developed an enlarging.  Patient has no acute medical changes in medical history since last met.  Patient has been healing well from his umbilical hernia repair.          Past Medical History:   History reviewed. No pertinent past medical history.           Past Surgical History:     Past Surgical History:   Procedure Laterality Date     ANTERIOR CRUCIATE LIGAMENT REPAIR Right 2012, 2014     HERNIA REPAIR       HERNIORRHAPHY, UMBILICAL, ROBOT-ASSISTED, LAPAROSCOPIC, USING DA KIANNA XI N/A 03/11/2022    Procedure: HERNIORRHAPHY, UMBILICAL, ROBOT-ASSISTED, LAPAROSCOPIC, USING DA KIANNA XI;  Surgeon: Shay Nguyen DO;  Location: [facility] [place] OR             Social History:    reports that he has never smoked. He has never used smokeless tobacco. He reports current alcohol use. He reports that he  does not use drugs.           Family History:     Family History   Problem Relation Age of Onset     Diabetes Maternal Grandmother      Diabetes Paternal Grandmother      Diabetes Paternal Grandfather      Migraines Mother               Allergies:   No Known Allergies           Medications:     Prior to Admission medications    Medication Sig Start Date End Date Taking? Authorizing Provider   amphetamine-dextroamphetamine (ADDERALL) 20 MG tablet Take 1 tablet (20 mg) by mouth 2 times daily 8/24/22  Yes Guru Vásquez DO   SUMAtriptan (IMITREX) 50 MG tablet Take  mg by mouth as needed 11/23/20  Yes Reported, Patient              Review of Systems:   The Review of Systems is negative other than noted in the HPI            Physical Exam:     Patient Vitals for the past 24 hrs:   BP   11/22/22 0954 112/78        [unfilled]   Constitutional:   awake, alert, cooperative, no apparent distress, and appears stated age       Eyes:   PERRL, conjunctiva/corneas clear, EOM's intact; no scleral edema or icterus noted        ENT:   Normocephalic, without obvious abnormality, atraumatic, Lips, mucosa, and tongue normal        Hematologic / Lymphatic:   No lymphadenopathy       Lungs:   Normal respiratory effort, no accessory muscle use       Cardiovascular:   Regular rate and rhythm       Abdomen:   The left inguinal canal was evaluated.  I do appreciate a small inguinal hernia to the left side it is mildly tender to palpation.  There is no appreciable right-sided inguinal hernia.  The umbilical hernia repair is intact.  No appreciable recurrent hernia on the left side.       Musculoskeletal:   No obvious swelling, bruising or deformity       Skin:   Skin color and texture normal for patient, no rashes or lesions              Data:              DO Shay Garcia DO  General Surgeon  [facility]  Surgery [address]  [address]  [place]  [address]?  Office: [phone]  Employed by [employer]  Pager: [phone]

## 2022-11-22 NOTE — LETTER
11/22/2022         RE: Henrry Gilliam  [address] [facility] [address]        Dear Colleague,    Thank you for referring your patient, Henrry Gilliam, to the [facility] SURGERY CLINIC AND BARIATRICS [facility]. Please see a copy of my visit note below.    General Surgery Consultation  Henrry Gilliam MRN# [id number]   Age/Sex: 32 year old male YOB: 1990     Reason for consult: 1. Left groin pain            Requesting physician: Guru Augustine DO                   Assessment and Plan:   Assessment:  1.  Left groin pain - I do appreciate a small left sided inguinal hernia on exam.      Plan:  - ordering left groin US to evaluate the contents and size of the hernia.   - will preemptively schedule patient for surgery to preserve spot.  We can discuss proceeding with surgery once ultrasound findings are completed.  -Thank you for allowing me to participate this patient's          Chief Complaint:     Chief Complaint   Patient presents with     Hernia     Hernia follow-up, possible second hernia?        History is obtained from the patient    HPI:   Henrry Gilliam is a 32 year old male who presents to the general surgery clinic today for evaluation of left groin pain.  Patient had left flank pain associated with his umbilical hernia back in March.  Patient now presents with left groin pain.  He would like to be further evaluated to determine whether or not he has developed an enlarging.  Patient has no acute medical changes in medical history since last met.  Patient has been healing well from his umbilical hernia repair.          Past Medical History:   History reviewed. No pertinent past medical history.           Past Surgical History:     Past Surgical History:   Procedure Laterality Date     ANTERIOR CRUCIATE LIGAMENT REPAIR Right 2012, 2014     HERNIA REPAIR       HERNIORRHAPHY, UMBILICAL, ROBOT-ASSISTED, LAPAROSCOPIC, USING DA KIANNA XI N/A 03/11/2022    Procedure:  HERNIORRHAPHY, UMBILICAL, ROBOT-ASSISTED, LAPAROSCOPIC, USING DA KIANNA XI;  Surgeon: Shay Nguyen DO;  Location: [facility] Main OR             Social History:    reports that he has never smoked. He has never used smokeless tobacco. He reports current alcohol use. He reports that he does not use drugs.           Family History:     Family History   Problem Relation Age of Onset     Diabetes Maternal Grandmother      Diabetes Paternal Grandmother      Diabetes Paternal Grandfather      Migraines Mother               Allergies:   No Known Allergies           Medications:     Prior to Admission medications    Medication Sig Start Date End Date Taking? Authorizing Provider   amphetamine-dextroamphetamine (ADDERALL) 20 MG tablet Take 1 tablet (20 mg) by mouth 2 times daily 8/24/22  Yes Guru Vásquez DO   SUMAtriptan (IMITREX) 50 MG tablet Take  mg by mouth as needed 11/23/20  Yes Reported, Patient              Review of Systems:   The Review of Systems is negative other than noted in the HPI            Physical Exam:     Patient Vitals for the past 24 hrs:   BP   11/22/22 0954 112/78        [unfilled]   Constitutional:   awake, alert, cooperative, no apparent distress, and appears stated age       Eyes:   PERRL, conjunctiva/corneas clear, EOM's intact; no scleral edema or icterus noted        ENT:   Normocephalic, without obvious abnormality, atraumatic, Lips, mucosa, and tongue normal        Hematologic / Lymphatic:   No lymphadenopathy       Lungs:   Normal respiratory effort, no accessory muscle use       Cardiovascular:   Regular rate and rhythm       Abdomen:   The left inguinal canal was evaluated.  I do appreciate a small inguinal hernia to the left side it is mildly tender to palpation.  There is no appreciable right-sided inguinal hernia.  The umbilical hernia repair is intact.  No appreciable recurrent hernia on the left side.       Musculoskeletal:   No obvious swelling, bruising or  deformity       Skin:   Skin color and texture normal for patient, no rashes or lesions              Data:              DO Shay Garcia DO  General Surgeon  [facility]  Surgery [facility] - [address] [address]?  Office: [phone]  Employed by - [employer]  Pager: [phone]             Again, thank you for allowing me to participate in the care of your patient.        Sincerely,        Shay Nguyen, DO

## 2022-11-23 ENCOUNTER — HOSPITAL ENCOUNTER (OUTPATIENT)
Dept: ULTRASOUND IMAGING | Facility: HOSPITAL | Age: 32
Discharge: HOME | End: 2022-11-23
Attending: SURGERY | Admitting: SURGERY
Payer: COMMERCIAL

## 2022-11-23 DIAGNOSIS — R10.32 LEFT GROIN PAIN: ICD-10-CM

## 2022-11-23 PROCEDURE — 76705 ECHO EXAM OF ABDOMEN: CPT

## 2022-12-05 ENCOUNTER — HOSPITAL ENCOUNTER (OUTPATIENT)
Facility: CLINIC | Age: 32
Discharge: HOME | End: 2022-12-05
Attending: SURGERY | Admitting: SURGERY
Payer: COMMERCIAL

## 2022-12-05 ENCOUNTER — ANESTHESIA EVENT (OUTPATIENT)
Dept: SURGERY | Facility: CLINIC | Age: 32
End: 2022-12-05
Payer: COMMERCIAL

## 2022-12-05 ENCOUNTER — ANESTHESIA (OUTPATIENT)
Dept: SURGERY | Facility: CLINIC | Age: 32
End: 2022-12-05
Payer: COMMERCIAL

## 2022-12-05 VITALS
SYSTOLIC BLOOD PRESSURE: 131 MMHG | BODY MASS INDEX: 29.52 KG/M2 | TEMPERATURE: 98.5 F | RESPIRATION RATE: 16 BRPM | HEIGHT: 77 IN | HEART RATE: 69 BPM | DIASTOLIC BLOOD PRESSURE: 72 MMHG | WEIGHT: 250 LBS | OXYGEN SATURATION: 98 %

## 2022-12-05 DIAGNOSIS — K40.90 LEFT INGUINAL HERNIA: Primary | ICD-10-CM

## 2022-12-05 PROCEDURE — 710N000010 HC RECOVERY PHASE 1, LEVEL 2, PER MIN: Performed by: SURGERY

## 2022-12-05 PROCEDURE — 370N000017 HC ANESTHESIA TECHNICAL FEE, PER MIN: Performed by: SURGERY

## 2022-12-05 PROCEDURE — 258N000003 HC RX IP 258 OP 636: Performed by: ANESTHESIOLOGY

## 2022-12-05 PROCEDURE — 250N000011 HC RX IP 250 OP 636: Performed by: ANESTHESIOLOGY

## 2022-12-05 PROCEDURE — C1781 MESH (IMPLANTABLE): HCPCS | Performed by: SURGERY

## 2022-12-05 PROCEDURE — 250N000009 HC RX 250: Performed by: NURSE ANESTHETIST, CERTIFIED REGISTERED

## 2022-12-05 PROCEDURE — 49650 LAP ING HERNIA REPAIR INIT: CPT | Mod: LT | Performed by: SURGERY

## 2022-12-05 PROCEDURE — 250N000011 HC RX IP 250 OP 636: Performed by: SURGERY

## 2022-12-05 PROCEDURE — 250N000025 HC SEVOFLURANE, PER MIN: Performed by: SURGERY

## 2022-12-05 PROCEDURE — 272N000001 HC OR GENERAL SUPPLY STERILE: Performed by: SURGERY

## 2022-12-05 PROCEDURE — 999N000141 HC STATISTIC PRE-PROCEDURE NURSING ASSESSMENT: Performed by: SURGERY

## 2022-12-05 PROCEDURE — S2900 ROBOTIC SURGICAL SYSTEM: HCPCS | Performed by: SURGERY

## 2022-12-05 PROCEDURE — 250N000013 HC RX MED GY IP 250 OP 250 PS 637: Performed by: ANESTHESIOLOGY

## 2022-12-05 PROCEDURE — 360N000080 HC SURGERY LEVEL 7, PER MIN: Performed by: SURGERY

## 2022-12-05 PROCEDURE — 250N000011 HC RX IP 250 OP 636: Performed by: NURSE ANESTHETIST, CERTIFIED REGISTERED

## 2022-12-05 PROCEDURE — 710N000012 HC RECOVERY PHASE 2, PER MINUTE: Performed by: SURGERY

## 2022-12-05 PROCEDURE — 250N000009 HC RX 250: Performed by: SURGERY

## 2022-12-05 DEVICE — SELF-GRIPPING POLYESTER MESH,POLYESTER WITH POLYLACTIC ACID GRIPS
Type: IMPLANTABLE DEVICE | Site: ABDOMEN | Status: FUNCTIONAL
Brand: PROGRIP

## 2022-12-05 RX ORDER — ONDANSETRON 4 MG/1
4 TABLET, ORALLY DISINTEGRATING ORAL EVERY 30 MIN PRN
Status: DISCONTINUED | OUTPATIENT
Start: 2022-12-05 | End: 2022-12-05 | Stop reason: HOSPADM

## 2022-12-05 RX ORDER — OXYCODONE HYDROCHLORIDE 5 MG/1
10 TABLET ORAL EVERY 4 HOURS PRN
Status: DISCONTINUED | OUTPATIENT
Start: 2022-12-05 | End: 2022-12-05 | Stop reason: HOSPADM

## 2022-12-05 RX ORDER — SODIUM CHLORIDE, SODIUM LACTATE, POTASSIUM CHLORIDE, CALCIUM CHLORIDE 600; 310; 30; 20 MG/100ML; MG/100ML; MG/100ML; MG/100ML
INJECTION, SOLUTION INTRAVENOUS CONTINUOUS
Status: DISCONTINUED | OUTPATIENT
Start: 2022-12-05 | End: 2022-12-05 | Stop reason: HOSPADM

## 2022-12-05 RX ORDER — ONDANSETRON 2 MG/ML
4 INJECTION INTRAMUSCULAR; INTRAVENOUS EVERY 30 MIN PRN
Status: DISCONTINUED | OUTPATIENT
Start: 2022-12-05 | End: 2022-12-05 | Stop reason: HOSPADM

## 2022-12-05 RX ORDER — PROPOFOL 10 MG/ML
INJECTION, EMULSION INTRAVENOUS PRN
Status: DISCONTINUED | OUTPATIENT
Start: 2022-12-05 | End: 2022-12-05

## 2022-12-05 RX ORDER — CEFAZOLIN SODIUM/WATER 2 G/20 ML
2 SYRINGE (ML) INTRAVENOUS SEE ADMIN INSTRUCTIONS
Status: DISCONTINUED | OUTPATIENT
Start: 2022-12-05 | End: 2022-12-05 | Stop reason: HOSPADM

## 2022-12-05 RX ORDER — FENTANYL CITRATE 50 UG/ML
25 INJECTION, SOLUTION INTRAMUSCULAR; INTRAVENOUS EVERY 5 MIN PRN
Status: DISCONTINUED | OUTPATIENT
Start: 2022-12-05 | End: 2022-12-05 | Stop reason: HOSPADM

## 2022-12-05 RX ORDER — LIDOCAINE 40 MG/G
CREAM TOPICAL
Status: DISCONTINUED | OUTPATIENT
Start: 2022-12-05 | End: 2022-12-05 | Stop reason: HOSPADM

## 2022-12-05 RX ORDER — PROPOFOL 10 MG/ML
INJECTION, EMULSION INTRAVENOUS CONTINUOUS PRN
Status: DISCONTINUED | OUTPATIENT
Start: 2022-12-05 | End: 2022-12-05

## 2022-12-05 RX ORDER — CEFAZOLIN SODIUM/WATER 2 G/20 ML
2 SYRINGE (ML) INTRAVENOUS
Status: COMPLETED | OUTPATIENT
Start: 2022-12-05 | End: 2022-12-05

## 2022-12-05 RX ORDER — HALOPERIDOL 5 MG/ML
1 INJECTION INTRAMUSCULAR
Status: DISCONTINUED | OUTPATIENT
Start: 2022-12-05 | End: 2022-12-05 | Stop reason: HOSPADM

## 2022-12-05 RX ORDER — OXYCODONE AND ACETAMINOPHEN 5; 325 MG/1; MG/1
1 TABLET ORAL EVERY 6 HOURS PRN
Qty: 12 TABLET | Refills: 0 | Status: SHIPPED | OUTPATIENT
Start: 2022-12-05 | End: 2022-12-08

## 2022-12-05 RX ORDER — ONDANSETRON 2 MG/ML
INJECTION INTRAMUSCULAR; INTRAVENOUS PRN
Status: DISCONTINUED | OUTPATIENT
Start: 2022-12-05 | End: 2022-12-05

## 2022-12-05 RX ORDER — ACETAMINOPHEN 325 MG/1
975 TABLET ORAL ONCE
Status: COMPLETED | OUTPATIENT
Start: 2022-12-05 | End: 2022-12-05

## 2022-12-05 RX ORDER — FENTANYL CITRATE 50 UG/ML
25 INJECTION, SOLUTION INTRAMUSCULAR; INTRAVENOUS
Status: DISCONTINUED | OUTPATIENT
Start: 2022-12-05 | End: 2022-12-05 | Stop reason: HOSPADM

## 2022-12-05 RX ORDER — KETOROLAC TROMETHAMINE 30 MG/ML
INJECTION, SOLUTION INTRAMUSCULAR; INTRAVENOUS PRN
Status: DISCONTINUED | OUTPATIENT
Start: 2022-12-05 | End: 2022-12-05

## 2022-12-05 RX ORDER — HYDROMORPHONE HCL IN WATER/PF 6 MG/30 ML
0.4 PATIENT CONTROLLED ANALGESIA SYRINGE INTRAVENOUS EVERY 5 MIN PRN
Status: DISCONTINUED | OUTPATIENT
Start: 2022-12-05 | End: 2022-12-05 | Stop reason: HOSPADM

## 2022-12-05 RX ORDER — DEXAMETHASONE SODIUM PHOSPHATE 10 MG/ML
INJECTION, SOLUTION INTRAMUSCULAR; INTRAVENOUS PRN
Status: DISCONTINUED | OUTPATIENT
Start: 2022-12-05 | End: 2022-12-05

## 2022-12-05 RX ORDER — LIDOCAINE HYDROCHLORIDE AND EPINEPHRINE 10; 10 MG/ML; UG/ML
INJECTION, SOLUTION INFILTRATION; PERINEURAL PRN
Status: DISCONTINUED | OUTPATIENT
Start: 2022-12-05 | End: 2022-12-05 | Stop reason: HOSPADM

## 2022-12-05 RX ORDER — FENTANYL CITRATE 50 UG/ML
50 INJECTION, SOLUTION INTRAMUSCULAR; INTRAVENOUS EVERY 5 MIN PRN
Status: DISCONTINUED | OUTPATIENT
Start: 2022-12-05 | End: 2022-12-05 | Stop reason: HOSPADM

## 2022-12-05 RX ORDER — MEPERIDINE HYDROCHLORIDE 25 MG/ML
12.5 INJECTION INTRAMUSCULAR; INTRAVENOUS; SUBCUTANEOUS
Status: DISCONTINUED | OUTPATIENT
Start: 2022-12-05 | End: 2022-12-05 | Stop reason: HOSPADM

## 2022-12-05 RX ORDER — LIDOCAINE HYDROCHLORIDE 10 MG/ML
INJECTION, SOLUTION INFILTRATION; PERINEURAL PRN
Status: DISCONTINUED | OUTPATIENT
Start: 2022-12-05 | End: 2022-12-05

## 2022-12-05 RX ORDER — DOCUSATE SODIUM 100 MG/1
100 CAPSULE, LIQUID FILLED ORAL 2 TIMES DAILY
Qty: 30 CAPSULE | Refills: 0 | Status: SHIPPED | OUTPATIENT
Start: 2022-12-05 | End: 2023-04-06

## 2022-12-05 RX ORDER — HYDROMORPHONE HCL IN WATER/PF 6 MG/30 ML
0.2 PATIENT CONTROLLED ANALGESIA SYRINGE INTRAVENOUS EVERY 5 MIN PRN
Status: DISCONTINUED | OUTPATIENT
Start: 2022-12-05 | End: 2022-12-05 | Stop reason: HOSPADM

## 2022-12-05 RX ORDER — OXYCODONE HYDROCHLORIDE 5 MG/1
5 TABLET ORAL EVERY 4 HOURS PRN
Status: DISCONTINUED | OUTPATIENT
Start: 2022-12-05 | End: 2022-12-05 | Stop reason: HOSPADM

## 2022-12-05 RX ORDER — FENTANYL CITRATE 50 UG/ML
INJECTION, SOLUTION INTRAMUSCULAR; INTRAVENOUS PRN
Status: DISCONTINUED | OUTPATIENT
Start: 2022-12-05 | End: 2022-12-05

## 2022-12-05 RX ADMIN — LIDOCAINE HYDROCHLORIDE 20 MG: 10 INJECTION, SOLUTION INFILTRATION; PERINEURAL at 09:52

## 2022-12-05 RX ADMIN — ONDANSETRON 4 MG: 2 INJECTION INTRAMUSCULAR; INTRAVENOUS at 12:00

## 2022-12-05 RX ADMIN — PROPOFOL 25 MCG/KG/MIN: 10 INJECTION, EMULSION INTRAVENOUS at 09:52

## 2022-12-05 RX ADMIN — FENTANYL CITRATE 25 MCG: 50 INJECTION INTRAMUSCULAR; INTRAVENOUS at 13:39

## 2022-12-05 RX ADMIN — SODIUM CHLORIDE, POTASSIUM CHLORIDE, SODIUM LACTATE AND CALCIUM CHLORIDE: 600; 310; 30; 20 INJECTION, SOLUTION INTRAVENOUS at 08:57

## 2022-12-05 RX ADMIN — KETOROLAC TROMETHAMINE 30 MG: 30 INJECTION, SOLUTION INTRAMUSCULAR at 12:00

## 2022-12-05 RX ADMIN — ROCURONIUM BROMIDE 20 MG: 10 INJECTION, SOLUTION INTRAVENOUS at 10:15

## 2022-12-05 RX ADMIN — FENTANYL CITRATE 25 MCG: 50 INJECTION INTRAMUSCULAR; INTRAVENOUS at 13:10

## 2022-12-05 RX ADMIN — OXYCODONE HYDROCHLORIDE 5 MG: 5 TABLET ORAL at 13:10

## 2022-12-05 RX ADMIN — ACETAMINOPHEN 975 MG: 325 TABLET ORAL at 08:56

## 2022-12-05 RX ADMIN — PROPOFOL 200 MG: 10 INJECTION, EMULSION INTRAVENOUS at 09:52

## 2022-12-05 RX ADMIN — Medication 2 G: at 09:45

## 2022-12-05 RX ADMIN — ROCURONIUM BROMIDE 50 MG: 10 INJECTION, SOLUTION INTRAVENOUS at 09:52

## 2022-12-05 RX ADMIN — SUGAMMADEX 200 MG: 100 INJECTION, SOLUTION INTRAVENOUS at 12:05

## 2022-12-05 RX ADMIN — DEXAMETHASONE SODIUM PHOSPHATE 10 MG: 10 INJECTION, SOLUTION INTRAMUSCULAR; INTRAVENOUS at 09:52

## 2022-12-05 RX ADMIN — FENTANYL CITRATE 100 MCG: 50 INJECTION, SOLUTION INTRAMUSCULAR; INTRAVENOUS at 09:52

## 2022-12-05 RX ADMIN — MIDAZOLAM 2 MG: 1 INJECTION INTRAMUSCULAR; INTRAVENOUS at 09:45

## 2022-12-05 RX ADMIN — SODIUM CHLORIDE, POTASSIUM CHLORIDE, SODIUM LACTATE AND CALCIUM CHLORIDE: 600; 310; 30; 20 INJECTION, SOLUTION INTRAVENOUS at 12:13

## 2022-12-05 RX ADMIN — ROCURONIUM BROMIDE 10 MG: 10 INJECTION, SOLUTION INTRAVENOUS at 11:20

## 2022-12-05 RX ADMIN — HYDROMORPHONE HYDROCHLORIDE 0.5 MG: 1 INJECTION, SOLUTION INTRAMUSCULAR; INTRAVENOUS; SUBCUTANEOUS at 11:30

## 2022-12-05 RX ADMIN — HYDROMORPHONE HYDROCHLORIDE 0.5 MG: 1 INJECTION, SOLUTION INTRAMUSCULAR; INTRAVENOUS; SUBCUTANEOUS at 10:17

## 2022-12-05 ASSESSMENT — ACTIVITIES OF DAILY LIVING (ADL)
ADLS_ACUITY_SCORE: 35

## 2022-12-05 ASSESSMENT — LIFESTYLE VARIABLES: TOBACCO_USE: 1

## 2022-12-05 NOTE — DISCHARGE INSTRUCTIONS
You received Toradol IV at 12pm. You may start Ibuprofen 600 mg every 6 hours after 6pm tonight.  Take with food to prevent stomach irritation.    Follow up: It is our practice to have all patients follow up with us 2-3 weeks after their surgery to ensure they are recovering well.  For straightforwardlaparoscopic procedures, this can be done either in clinic as a scheduled follow up appointment, or over the phone.  If you would like a scheduled follow up appointment in clinic, please call us at [phone] to schedule an appointment at your convenience.  If you would prefer to follow up with us by phone please let us know so that we may contact you 2-3 weeks following your procedure.         Diet: Regular diet. Patients can have difficulty with constipation following surgery, due in part to the administration of narcotic medications.  If you are suffering with constipation, you should avoid foods such as hard cheeses or red meat.  Foods high infiber are recommended.       Activity: You should continue to be active at home, including ambulating frequently.  If possible try to limit the amount of time spent in bed.     Restrictions: Avoid lifting more than 10 lbs for 2 wks.  Walking does not count as strenuous physical activity.  You are safe to walk up and down stairs.  Following 2 weeks you may resume all normal physical activity.     Wound / drain care: Your incisions are closed using absorbale sutures.  The skin is sealed with a surgical glue.  Do not peal the glue off.  Please allow the glue to peal off on its own.      It is normal to have a smallrim of red present around the incisions. This should not, however, extend beyond 1/4 inch from the incision.  If your incisions become increasingly tender, red, or draining, please contact us.        Bathing: Youmay shower after 24 hours from surgery.  It is ok to get your incisions wet, but avoid rubbing them.  Avoid soaking in bath tubs, or swimming in lakes, pools,  or streams for 4 weeks following surgery.

## 2022-12-05 NOTE — H&P
General Surgery H&P  Henrry Gilliam MRN# [id number]   Age/Sex: 32 year old male YOB: 1990     Reason for visit: Left inguinal hernia                           Assessment and Plan:   Assessment:  1.  Left inguinal hernia    Plan:  - To the OR today for robotic assisted laparoscopic assisted repair of left sided inguinal hernia  -  The risks and benefits of the procedure were explained detail to the patient. The risks include infection, bleeding, damage to the surrounding structures. Patient verbalized understanding provided consent to undergo the procedure above.  - I discussed in detail with the patient regarding mesh placement.  I explained the different options of repairing the hernia.  If the hernia was repaired primarily without mesh, there is a higher chance of recurrence.  We discussed in detail using synthetic versus biologic mesh.  I explained to the different scenarios in which each mesh would be more appropriate.  Patient was allowed time to ask questions and concerns regarding mesh placement.  The patient is accepting of the use of mesh with the hernia repair.            Chief Complaint:   Here for surgery     History is obtained from the patient    HPI:   Henrry Gilliam is a 32 year old male who presents to [facility] today for a left-sided inguinal hernia repair.  Patient was worked up for the left groin pain.  Patient has not had any acute changes to his medical history since the last time that we have met.          Past Medical History:   History reviewed. No pertinent past medical history.           Past Surgical History:     Past Surgical History:   Procedure Laterality Date     ANTERIOR CRUCIATE LIGAMENT REPAIR Right 2012, 2014     HERNIA REPAIR       HERNIORRHAPHY, UMBILICAL, ROBOT-ASSISTED, LAPAROSCOPIC, USING DA KIANNA XI N/A 03/11/2022    Procedure: HERNIORRHAPHY, UMBILICAL, ROBOT-ASSISTED, LAPAROSCOPIC, USING DA KIANNA XI;  Surgeon: Shay Nguyen DO;  Location: [facility]  "Main OR             Social History:    reports that he has never smoked. He has never used smokeless tobacco. He reports current alcohol use. He reports that he does not use drugs.           Family History:     Family History   Problem Relation Age of Onset     Diabetes Maternal Grandmother      Diabetes Paternal Grandmother      Diabetes Paternal Grandfather      Migraines Mother               Allergies:   No Known Allergies           Medications:     Prior to Admission medications    Medication Sig Start Date End Date Taking? Authorizing Provider   amphetamine-dextroamphetamine (ADDERALL) 20 MG tablet Take 1 tablet (20 mg) by mouth 2 times daily 8/24/22  Yes Guru Vásquez DO   SUMAtriptan (IMITREX) 50 MG tablet Take  mg by mouth as needed 11/23/20  Yes Reported, Patient              Review of Systems:   A 12 point Review of Systems is negative other than noted in the HPI            Physical Exam:     Patient Vitals for the past 24 hrs:   BP Temp Temp src Pulse Resp SpO2 Height Weight   12/05/22 0838 126/75 97.9  F (36.6  C) Temporal 66 16 97 % 1.956 m (6' 5\") 113.4 kg (250 lb)        No intake or output data in the 24 hours ending 12/05/22 0917   Constitutional:   awake, alert, cooperative, no apparent distress, and appears stated age       Eyes:   PERRL, conjunctiva/corneas clear, EOM's intact; no scleral edema or icterus noted        ENT:   Normocephalic, without obvious abnormality, atraumatic, Lips, mucosa, and tongue normal        Hematologic / Lymphatic:   No lymphadenopathy       Lungs:   Normal respiratory effort, no accessory muscle use, breath sounds bilaterally on auscultation       Cardiovascular:   Regular rate and rhythm       Abdomen:   Soft, nondistended, nontender to palpation    Left inguinal hernia reducible.       Musculoskeletal:   No obvious swelling, bruising or deformity       Skin:   Skin color and texture normal for patient, no rashes or lesions              Data:      "   Ultrasound findings do identify a left-sided inguinal hernia fat-containing.      DO Shay Garcia DO  General Surgeon  [facility]  Surgery [facility] - [address] [address]?  Office: [phone]  Employed by - [employer]  Pager: [phone]

## 2022-12-05 NOTE — ANESTHESIA CARE TRANSFER NOTE
Patient: Henrry Gilliam    Procedure: Procedure(s):  HERNIORRHAPHY, INGUINAL, ROBOT-ASSISTED, LAPAROSCOPIC, USING DA KIANNA XI, LYSIS OF ADHESIONS       Diagnosis: Left groin pain [R10.32]  Diagnosis Additional Information: No value filed.    Anesthesia Type:   General     Note:    Oropharynx: oropharynx clear of all foreign objects  Level of Consciousness: awake  Oxygen Supplementation: face mask  Level of Supplemental Oxygen (L/min / FiO2): 6  Independent Airway: airway patency satisfactory and stable  Dentition: dentition unchanged  Vital Signs Stable: post-procedure vital signs reviewed and stable  Report to RN Given: handoff report given  Patient transferred to: PACU    Handoff Report: Identifed the Patient, Identified the Reponsible Provider, Reviewed the pertinent medical history, Discussed the surgical course, Reviewed Intra-OP anesthesia mangement and issues during anesthesia, Set expectations for post-procedure period and Allowed opportunity for questions and acknowledgement of understanding      Vitals:  Vitals Value Taken Time   /83 12/05/22 1215   Temp 36.6  C (97.9  F) 12/05/22 1215   Pulse 59 12/05/22 1217   Resp 15 12/05/22 1217   SpO2 100 % 12/05/22 1217   Vitals shown include unvalidated device data.    Electronically Signed By: YUDITH Tovar CRNA  December 5, 2022  12:18 PM

## 2022-12-05 NOTE — ANESTHESIA PROCEDURE NOTES
Airway         Procedure Start/Stop Times: 12/5/2022 9:54 AM and 12/5/2022 10:56 AM  Staff -        CRNA: Simba Szymanski APRN CRNA       Performed By: CRNA  Consent for Airway        Urgency: elective  Indications and Patient Condition       Indications for airway management: kathi-procedural       Induction type:intravenous       Mask difficulty assessment: 1 - vent by mask    Final Airway Details       Final airway type: endotracheal airway       Successful airway: ETT - single  Endotracheal Airway Details        ETT size (mm): 7.5       Cuffed: yes       Successful intubation technique: direct laryngoscopy       DL Blade Type: Tejada 2       Grade View of Cords: 1       Adjucts: stylet       Position: Right       Measured from: lips       Secured at (cm): 24       Bite block used: Oral Airway    Post intubation assessment        Placement verified by: capnometry and equal breath sounds        Number of attempts at approach: 1       Secured with: silk tape       Ease of procedure: easy       Dentition: Intact and Unchanged       Dental guard used and removed. Dental Guard Type: Proguard Red.    Medication(s) Administered   Medication Administration Time: 12/5/2022 9:54 AM

## 2022-12-05 NOTE — ANESTHESIA POSTPROCEDURE EVALUATION
Patient: Henrry Gilliam    Procedure: Procedure(s):  HERNIORRHAPHY, INGUINAL, ROBOT-ASSISTED, LAPAROSCOPIC, USING DA KIANNA XI, LYSIS OF ADHESIONS       Anesthesia Type:  General    Note:  Disposition: Outpatient   Postop Pain Control: Uneventful            Sign Out: Well controlled pain   PONV: No   Neuro/Psych: Uneventful            Sign Out: Acceptable/Baseline neuro status   Airway/Respiratory: Uneventful            Sign Out: Acceptable/Baseline resp. status   CV/Hemodynamics: Uneventful            Sign Out: Acceptable CV status; No obvious hypovolemia; No obvious fluid overload   Other NRE: NONE   DID A NON-ROUTINE EVENT OCCUR? No           Last vitals:  Vitals Value Taken Time   /67 12/05/22 1240   Temp 36.8  C (98.3  F) 12/05/22 1240   Pulse 79 12/05/22 1242   Resp 15 12/05/22 1242   SpO2 95 % 12/05/22 1242   Vitals shown include unvalidated device data.    Electronically Signed By: FERNANDA CAMARGO MD  December 5, 2022  1:44 PM

## 2022-12-05 NOTE — ANESTHESIA PREPROCEDURE EVALUATION
Anesthesia Pre-Procedure Evaluation    Patient: Henrry Gilliam   MRN: [id number] : 1990        Procedure : Procedure(s):  HERNIORRHAPHY, INGUINAL, ROBOT-ASSISTED, LAPAROSCOPIC, USING DA KIANNA XI          History reviewed. No pertinent past medical history.   Past Surgical History:   Procedure Laterality Date     ANTERIOR CRUCIATE LIGAMENT REPAIR Right ,      HERNIA REPAIR       HERNIORRHAPHY, UMBILICAL, ROBOT-ASSISTED, LAPAROSCOPIC, USING DA KIANNA XI N/A 2022    Procedure: HERNIORRHAPHY, UMBILICAL, ROBOT-ASSISTED, LAPAROSCOPIC, USING DA KIANNA XI;  Surgeon: Shay Nguyen DO;  Location: [facility] OR      No Known Allergies   Social History     Tobacco Use     Smoking status: Never     Smokeless tobacco: Never   Substance Use Topics     Alcohol use: Yes     Types: 5 Cans of beer per week     Comment: one drink per week      Wt Readings from Last 1 Encounters:   22 113.4 kg (250 lb)        Anesthesia Evaluation   Pt has had prior anesthetic.     No history of anesthetic complications       ROS/MED HX  ENT/Pulmonary:     (+) tobacco use,     Neurologic:     (+) migraines,     Cardiovascular:  - neg cardiovascular ROS     METS/Exercise Tolerance: >4 METS    Hematologic:  - neg hematologic  ROS     Musculoskeletal:  - neg musculoskeletal ROS     GI/Hepatic:  - neg GI/hepatic ROS     Renal/Genitourinary:  - neg Renal ROS     Endo:  - neg endo ROS     Psychiatric/Substance Use: Comment: ADHD      Infectious Disease:  - neg infectious disease ROS     Malignancy:  - neg malignancy ROS     Other:  - neg other ROS          Physical Exam    Airway  airway exam normal      Mallampati: I   TM distance: > 3 FB   Neck ROM: full   Mouth opening: > 3 cm    Respiratory Devices and Support         Dental  no notable dental history         Cardiovascular   cardiovascular exam normal       Rhythm and rate: regular and normal     Pulmonary   pulmonary exam normal        breath sounds clear to auscultation            OUTSIDE LABS:  CBC: No results found for: WBC, HGB, HCT, PLT  BMP:   Lab Results   Component Value Date     08/16/2022     01/07/2020    POTASSIUM 4.5 08/16/2022    POTASSIUM 4.1 01/07/2020    CHLORIDE 104 08/16/2022    CHLORIDE 104 01/07/2020    CO2 27 08/16/2022    CO2 25 01/07/2020    BUN 10.5 08/16/2022    BUN 16 01/07/2020    CR 1.10 08/16/2022    CR 1.10 01/07/2020    GLC 97 08/16/2022    GLC 86 01/07/2020     COAGS: No results found for: PTT, INR, FIBR  POC: No results found for: BGM, HCG, HCGS  HEPATIC: No results found for: ALBUMIN, PROTTOTAL, ALT, AST, GGT, ALKPHOS, BILITOTAL, BILIDIRECT, AUSTIN  OTHER:   Lab Results   Component Value Date    A1C 5.2 02/05/2020    ANTONINO 9.7 08/16/2022       Anesthesia Plan    ASA Status:  2   NPO Status:  NPO Appropriate    Anesthesia Type: General.     - Airway: ETT   Induction: Intravenous, Propofol.   Maintenance: Balanced.        Consents    Anesthesia Plan(s) and associated risks, benefits, and realistic alternatives discussed. Questions answered and patient/representative(s) expressed understanding.    - Discussed:     - Discussed with:  Patient         Postoperative Care    Pain management: IV analgesics, Oral pain medications, Multi-modal analgesia.   PONV prophylaxis: Ondansetron (or other 5HT-3), Dexamethasone or Solumedrol, Droperidol or Haldol     Comments:                FERNANDA CAMARGO MD

## 2022-12-05 NOTE — OP NOTE
[facility]    Operative Note    Pre-operative diagnosis: Left groin pain [R10.32]  Post-operative diagnosis Same as pre-operative diagnosis, small bowel adhesions to previous umbilical hernia    Procedure: Procedure(s):  HERNIORRHAPHY, INGUINAL, ROBOT-ASSISTED, LAPAROSCOPIC, USING DA KIANNA XI, LYSIS OF ADHESIONS  Surgeon: Surgeon(s) and Role:     * Shay Nguyen, DO - Primary  Anesthesia: General   Estimated Blood Loss: 15 mL    Drains: None  Specimens: * No specimens in log *  Findings:     - Significant small bowel adhesions onto the anterior abdominal wall where the patient had his previous mesh in the umbilical hernia repair.  -Sided inguinal hernia with herniated fat.     Complications: None.  Implants:   Implant Name Type Inv. Item Serial No.  Lot No. LRB No. Used Action   MESH POLYESTER PROGRIP [id number] PARIETEX [id number] - [id number] Mesh MESH POLYESTER PROGRIP [id number] PARIETEX [id number]  [facility] CenterFlipGive [id number] Left 1 Implanted     Procedure: Patient was brought to the operating room placed on operating table in the supine position.  Patient's bilateral upper extremities were padded and tucked in the usual fashion.  Patient underwent sedation and intubation by the anesthesia team.  Patient's abdomen was prepped and draped in usual sterile fashion.  Prior to initiating procedure, a timeout was completed.  All present were in agreement.      1% lidocaine with epinephrine was localized over Lopez's point.  An 11 blade was used to make a pinpoint skin incision to allow the Veress needle for abdominal access.  Once the Veress needle was intra-abdominal insufflation was initiated.  Once sufficient insufflation was obtained, x3 8 mm ports were placed at the same level supraumbilical, right upper quadrant, left upper quadrant.  Ports were placed under direct visualization.  The robot was then docked.    On general survey, the patient had a left inguinal hernia.  The inguinal hernia  was an indirect hernia.  I could also see that there were no injuries to the abdominal contents upon entry into the abdomen.  I could also identify that the patient had significant small bowel that was adherent onto the previous mesh that was used for his umbilical hernia repair.  As a result care and time was taken to ensure that we could easily dissect the small bowel off of the previous mesh.  Once the section of small bowel was removed off of the mesh, I did run the small bowel to ensure that there was no injuries to the small bowel.    The peritoneal flap was then created using a combination of electrocautery as well as sharp dissection with the scissors.  This flap was taken down below Ronan's ligament.  The flap was extended all the way to the lateral side towards the left ASIS and medially towards the pubic symphysis.  Once the flap was created, the hernia sac was identified and reduced using a combination of gentle traction and also sharp dissection with the scissors as well as electrocautery.  Once the hernia sac was reduced, the cord lipoma was also identified and reduced.  The cord structures were all identified.  Care was taken to ensure that there was no injury to the cord structures.      A 10 x 15 cm ProGrip mesh was then inserted and placed in the left inguinal region to cover the left inguinal hernia.  Once the mesh was in proper position, a 2-0 V lock stitch was then used to close the peritoneal flap.  The trochars were then removed under direct visualization.  There is no identifiable bleeding from any of the trocar sites.  The trocar sites were then closed using a 4-0 vicryl stitch in a subcuticular fashion.  The surgical incisions were reinforced using surgical glue.      At the end of the procedure a final count was completed.  I was told that all sharps, sponges, instruments were accounted for. The patient's testicles were then retracted back into the scrotum.  The patient had the sedation  reversed and was extubated and brought back to the PACU in stable condition.                            Shay Nguyen DO  General Surgeon  [facility]  Surgery [address] [address]?  Office: [phone]  Employed by - [facility]  Pager: [phone]

## 2022-12-05 NOTE — PHARMACY-ADMISSION MEDICATION HISTORY
Pharmacy Note - Admission Medication History    Pertinent Provider Information: n/a   ______________________________________________________________________    Prior To Admission (PTA) med list completed and updated in EMR.       PTA Med List   Medication Sig Note Last Dose     amphetamine-dextroamphetamine (ADDERALL) 20 MG tablet Take 1 tablet (20 mg) by mouth 2 times daily 12/5/2022: Takes Monday-Friday 12/2/2022     SUMAtriptan (IMITREX) 50 MG tablet Take  mg by mouth as needed  PRN       Information source(s): Patient and CareEverywhere/[facility]    Method of interview communication: in-person    Patient was asked about OTC/herbal products specifically.  PTA med list reflects this.    Based on the pharmacist's assessment, the PTA med list information appears reliable    Allergies were reviewed, assessed, and updated with the patient.      Patient does not use any multi-dose medications prior to admission.     Thank you for the opportunity to participate in the care of this patient.      Mary Melendrez [facility]     12/5/2022     8:32 AM

## 2022-12-13 ENCOUNTER — VIRTUAL VISIT (OUTPATIENT)
Dept: FAMILY MEDICINE | Facility: CLINIC | Age: 32
End: 2022-12-13
Payer: COMMERCIAL

## 2022-12-13 DIAGNOSIS — F33.1 MODERATE EPISODE OF RECURRENT MAJOR DEPRESSIVE DISORDER (H): ICD-10-CM

## 2022-12-13 DIAGNOSIS — Z86.69 HISTORY OF MIGRAINE HEADACHES: ICD-10-CM

## 2022-12-13 DIAGNOSIS — F90.2 ADHD (ATTENTION DEFICIT HYPERACTIVITY DISORDER), COMBINED TYPE: Primary | ICD-10-CM

## 2022-12-13 DIAGNOSIS — F41.9 ANXIETY: ICD-10-CM

## 2022-12-13 PROCEDURE — 96127 BRIEF EMOTIONAL/BEHAV ASSMT: CPT | Mod: 95 | Performed by: FAMILY MEDICINE

## 2022-12-13 PROCEDURE — 99214 OFFICE O/P EST MOD 30 MIN: CPT | Mod: 95 | Performed by: FAMILY MEDICINE

## 2022-12-13 RX ORDER — DEXTROAMPHETAMINE SACCHARATE, AMPHETAMINE ASPARTATE, DEXTROAMPHETAMINE SULFATE AND AMPHETAMINE SULFATE 5; 5; 5; 5 MG/1; MG/1; MG/1; MG/1
20 TABLET ORAL 2 TIMES DAILY
Qty: 180 TABLET | Refills: 0 | Status: SHIPPED | OUTPATIENT
Start: 2022-12-13 | End: 2023-04-06

## 2022-12-13 ASSESSMENT — PATIENT HEALTH QUESTIONNAIRE - PHQ9
10. IF YOU CHECKED OFF ANY PROBLEMS, HOW DIFFICULT HAVE THESE PROBLEMS MADE IT FOR YOU TO DO YOUR WORK, TAKE CARE OF THINGS AT HOME, OR GET ALONG WITH OTHER PEOPLE: NOT DIFFICULT AT ALL
SUM OF ALL RESPONSES TO PHQ QUESTIONS 1-9: 2
SUM OF ALL RESPONSES TO PHQ QUESTIONS 1-9: 2

## 2022-12-13 NOTE — PROGRESS NOTES
Henrry is a 32 year old who is being evaluated via a billable video visit.      How would you like to obtain your AVS? MyChart  If the video visit is dropped, the invitation should be resent by: Text to cell phone: [phone]  Will anyone else be joining your video visit? No          Assessment & Plan     ADHD (attention deficit hyperactivity disorder), combined type  He was given a refill of Adderall which continues to help with ADHD symptoms without unwanted side effects.  - amphetamine-dextroamphetamine (ADDERALL) 20 MG tablet; Take 1 tablet (20 mg) by mouth 2 times daily    Moderate episode of recurrent major depressive disorder (H)  Depression symptoms have been stable without medication.    Anxiety  Anxiety symptoms are stable without medication.    History of migraine headaches  He reports that this has not been a significant problem for him as long as he avoids eating processed meats.                     Return in about 3 months (around 3/13/2023) for ADHD Follow Up.    Guru Augustine, [facility]   Henrry is a 32 year old, presenting for the following health issues:  No chief complaint on file.      History of Present Illness       Reason for visit:  Medication Check up    He eats 0-1 servings of fruits and vegetables daily.He consumes 0 sweetened beverage(s) daily.He exercises with enough effort to increase his heart rate 10 to 19 minutes per day.  He exercises with enough effort to increase his heart rate 3 or less days per week.   He is taking medications regularly.    Today's PHQ-9         PHQ-9 Total Score: 2    PHQ-9 Q9 Thoughts of better off dead/self-harm past 2 weeks :   Not at all    How difficult have these problems made it for you to do your work, take care of things at home, or get along with other people: Not difficult at all             He has a history of ADHD combined type, depression and anxiety.  He feels like mental health symptoms have been  very stable and he is doing well on Adderall 20 mg twice daily.  He does not feel like he needs to take this medication every single day.  Appetite, sleep and mood have been good.  He also has a history of migraine headaches and has sumatriptan available as needed.  He reports not needing this medication for a while.  He thinks that processed meats have been a trigger for this and as long as he avoids this type of food he tends not to get headaches.        Co-Morbid Diagnosis: Depression and Anxiety well controlled without medication.        Medication Benefits:   Controlled symptoms: Attention span, Distractability and Finishing tasks  Uncontrolled Symptoms: None     Medication side effects:  Side effects noted: none  Denies: appetite suppression, weight loss and insomnia      Review of Systems   Constitutional, HEENT, cardiovascular, pulmonary, GI, , musculoskeletal, neuro, skin, endocrine and psych systems are negative, except as otherwise noted.      Objective         Vitals:  No vitals were obtained today due to virtual visit.    Physical Exam   GENERAL: Healthy, alert and no distress  EYES: Eyes grossly normal to inspection.  No discharge or erythema, or obvious scleral/conjunctival abnormalities.  RESP: No audible wheeze, cough, or visible cyanosis.  No visible retractions or increased work of breathing.    SKIN: Visible skin clear. No significant rash, abnormal pigmentation or lesions.  NEURO: Cranial nerves grossly intact.  Mentation and speech appropriate for age.  PSYCH: Mentation appears normal, affect normal/bright, judgement and insight intact, normal speech and appearance well-groomed.                Video-Visit Details    Video Start Time: 2:48 PM    Type of service:  Video Visit    Video End Time:2:59 PM    Originating Location (pt. Location): Home        Distant Location (provider location):  On-site    Platform used for Video Visit: [employer]om[employer]

## 2022-12-21 ENCOUNTER — VIRTUAL VISIT (OUTPATIENT)
Dept: SURGERY | Facility: CLINIC | Age: 32
End: 2022-12-21
Payer: COMMERCIAL

## 2022-12-21 DIAGNOSIS — Z48.89 ENCOUNTER FOR POSTOPERATIVE CARE: Primary | ICD-10-CM

## 2022-12-21 PROCEDURE — 99024 POSTOP FOLLOW-UP VISIT: CPT | Performed by: PHYSICIAN ASSISTANT

## 2022-12-21 NOTE — LETTER
"    12/21/2022         RE: Henrry Gilliam  [id number]  [facility] [address]        Dear Colleague,    Thank you for referring your patient, Henrry Gilliam, to the [facility]. Please see a copy of my visit note below.    The patient has been notified of following:     \"This telephone visit will be conducted via a call between you and your physician/provider. We have found that certain health care needs can be provided without the need for a physical exam.  This service lets us provide the care you need with a short phone conversation.  If a prescription is necessary we can send it directly to your pharmacy.  If lab work is needed we can place an order for that and you can then stop by our lab to have the test done at a later time.    Telephone visits are billed at different rates depending on your insurance coverage. During this emergency period, for some insurers they may be billed the same as an in-person visit.  Please reach out to your insurance provider with any questions.    If during the course of the call the physician/provider feels a telephone visit is not appropriate, you will not be charged for this service.\"    Patient has given verbal consent for Telephone visit?  Yes    What phone number would you like to be contacted at? home    How would you like to obtain your AVS? Cornelia   HPI: 32 year male underwent Robotic inguinal hernia repair with Dr Nguyen on 12/5/22. Doing well. No pain issues. No complaints.       There were no vitals taken for this visit.    EXAM:  NA  Assessment/Plan: . Doing well after surgery and should follow up as needed.    Daphne LUTZ              Again, thank you for allowing me to participate in the care of your patient.        Sincerely,        Daphne Mcconnell PA-C    "

## 2023-01-03 NOTE — PROGRESS NOTES
"The patient has been notified of following:     \"This telephone visit will be conducted via a call between you and your physician/provider. We have found that certain health care needs can be provided without the need for a physical exam.  This service lets us provide the care you need with a short phone conversation.  If a prescription is necessary we can send it directly to your pharmacy.  If lab work is needed we can place an order for that and you can then stop by our lab to have the test done at a later time.    Telephone visits are billed at different rates depending on your insurance coverage. During this emergency period, for some insurers they may be billed the same as an in-person visit.  Please reach out to your insurance provider with any questions.    If during the course of the call the physician/provider feels a telephone visit is not appropriate, you will not be charged for this service.\"    Patient has given verbal consent for Telephone visit?  Yes    What phone number would you like to be contacted at? home    How would you like to obtain your AVS? Cornelia   HPI: 32 year male underwent Robotic inguinal hernia repair with Dr Nguyen on 12/5/22. Doing well. No pain issues. No complaints.       There were no vitals taken for this visit.    EXAM:  NA  Assessment/Plan: . Doing well after surgery and should follow up as needed.    Daphne Mcconnell MPA-C          "

## 2023-04-05 ENCOUNTER — MYC REFILL (OUTPATIENT)
Dept: FAMILY MEDICINE | Facility: CLINIC | Age: 33
End: 2023-04-05
Payer: COMMERCIAL

## 2023-04-05 DIAGNOSIS — F90.2 ADHD (ATTENTION DEFICIT HYPERACTIVITY DISORDER), COMBINED TYPE: ICD-10-CM

## 2023-04-05 RX ORDER — DEXTROAMPHETAMINE SACCHARATE, AMPHETAMINE ASPARTATE, DEXTROAMPHETAMINE SULFATE AND AMPHETAMINE SULFATE 5; 5; 5; 5 MG/1; MG/1; MG/1; MG/1
20 TABLET ORAL 2 TIMES DAILY
Qty: 180 TABLET | Refills: 0 | Status: CANCELLED | OUTPATIENT
Start: 2023-04-05

## 2023-04-06 ENCOUNTER — VIRTUAL VISIT (OUTPATIENT)
Dept: FAMILY MEDICINE | Facility: CLINIC | Age: 33
End: 2023-04-06
Payer: COMMERCIAL

## 2023-04-06 DIAGNOSIS — R10.32 LEFT INGUINAL PAIN: ICD-10-CM

## 2023-04-06 DIAGNOSIS — F41.9 ANXIETY: ICD-10-CM

## 2023-04-06 DIAGNOSIS — F90.2 ADHD (ATTENTION DEFICIT HYPERACTIVITY DISORDER), COMBINED TYPE: Primary | ICD-10-CM

## 2023-04-06 DIAGNOSIS — F33.1 MODERATE EPISODE OF RECURRENT MAJOR DEPRESSIVE DISORDER (H): ICD-10-CM

## 2023-04-06 DIAGNOSIS — Z98.890 HISTORY OF LEFT INGUINAL HERNIA REPAIR: ICD-10-CM

## 2023-04-06 DIAGNOSIS — Z87.19 HISTORY OF LEFT INGUINAL HERNIA REPAIR: ICD-10-CM

## 2023-04-06 PROCEDURE — 99214 OFFICE O/P EST MOD 30 MIN: CPT | Mod: VID | Performed by: FAMILY MEDICINE

## 2023-04-06 RX ORDER — DEXTROAMPHETAMINE SACCHARATE, AMPHETAMINE ASPARTATE, DEXTROAMPHETAMINE SULFATE AND AMPHETAMINE SULFATE 5; 5; 5; 5 MG/1; MG/1; MG/1; MG/1
20 TABLET ORAL 2 TIMES DAILY
Qty: 180 TABLET | Refills: 0 | Status: SHIPPED | OUTPATIENT
Start: 2023-04-06 | End: 2024-01-16

## 2023-04-06 NOTE — PROGRESS NOTES
Henrry is a 32 year old who is being evaluated via a billable video visit.      How would you like to obtain your AVS? MyChart  If the video visit is dropped, the invitation should be resent by: Text to cell phone: [phone]  Will anyone else be joining your video visit? No          Assessment & Plan     ADHD (attention deficit hyperactivity disorder), combined type  He was given a refill of Adderall which continues to help with ADHD symptoms without unwanted side effects.  - amphetamine-dextroamphetamine (ADDERALL) 20 MG tablet; Take 1 tablet (20 mg) by mouth 2 times daily    Moderate episode of recurrent major depressive disorder (H)  Mental health symptoms have been stable without medication.    Anxiety  Mental health symptoms have been stable without medication.    Left inguinal pain  I recommended he schedule follow-up with his surgeon who did the left inguinal hernia repair surgery on 12/5/2022 to evaluate his pain symptoms further.    History of left inguinal hernia repair                       Guru Augustine [facility]   Henrry is a 32 year old, presenting for the following health issues:  A.CATHLEENHCR        3/4/2022     9:31 AM   Additional Questions   Roomed by Jessica BRODERICK    History of Present Illness       Reason for visit:  Medication    He eats 0-1 servings of fruits and vegetables daily.He consumes 1 sweetened beverage(s) daily.He exercises with enough effort to increase his heart rate 10 to 19 minutes per day.  He exercises with enough effort to increase his heart rate 4 days per week.   He is taking medications regularly.       ADHD Follow-Up    Date of last ADHD office visit: 12/13/23  Status since last visit: Improving  Taking controlled (daily) medications as prescribed: Yes                       Parent/Patient Concerns with Medications: None  ADHD Medication     Amphetamines Disp Start End     amphetamine-dextroamphetamine (ADDERALL) 20 MG  tablet    180 tablet 12/13/2022     Sig - Route: Take 1 tablet (20 mg) by mouth 2 times daily - Oral    Class: E-Prescribe    Earliest Fill Date: 12/13/2022        He has a history of ADHD combined type, depression and anxiety.  He feels like mental health symptoms have been very stable and he is doing well on Adderall 20 mg twice daily.  He does not always feel like he needs to take this medication every single day.  Appetite, sleep and mood have been good.  He also has a history of migraine headaches and has sumatriptan available as needed.  He reports not needing this medication for a while.  He notes that processed meats have been a trigger for this and as long as he avoids this type of food he tends not to get headaches.  He describes having some dull aching discomfort in the left lower abdomen/inguinal region.  He had left inguinal hernia repair surgery this past December and would like to follow-up with his surgeon about this.      {          Review of Systems   Constitutional, HEENT, cardiovascular, pulmonary, GI, , musculoskeletal, neuro, skin, endocrine and psych systems are negative, except as otherwise noted.      Objective    Vitals - Patient Reported  Weight (Patient Reported): 113.4 kg (250 lb)  Pain Score: No Pain (0)      Vitals:  No vitals were obtained today due to virtual visit.    Physical Exam   GENERAL: Healthy, alert and no distress  EYES: Eyes grossly normal to inspection.  No discharge or erythema, or obvious scleral/conjunctival abnormalities.  RESP: No audible wheeze, cough, or visible cyanosis.  No visible retractions or increased work of breathing.    SKIN: Visible skin clear. No significant rash, abnormal pigmentation or lesions.  NEURO: Cranial nerves grossly intact.  Mentation and speech appropriate for age.  PSYCH: Mentation appears normal, affect normal/bright, judgement and insight intact, normal speech and appearance well-groomed.                Video-Visit Details    Type of  service:  Video Visit     Originating Location (pt. Location): Home    Distant Location (provider location):  On-site  Platform used for Video Visit: Gm

## 2023-04-30 ENCOUNTER — HEALTH MAINTENANCE LETTER (OUTPATIENT)
Age: 33
End: 2023-04-30

## 2023-05-23 ENCOUNTER — TRANSFERRED RECORDS (OUTPATIENT)
Dept: HEALTH INFORMATION MANAGEMENT | Facility: CLINIC | Age: 33
End: 2023-05-23
Payer: COMMERCIAL

## 2023-08-29 ENCOUNTER — OFFICE VISIT (OUTPATIENT)
Dept: FAMILY MEDICINE | Facility: CLINIC | Age: 33
End: 2023-08-29
Payer: COMMERCIAL

## 2023-08-29 VITALS
OXYGEN SATURATION: 98 % | DIASTOLIC BLOOD PRESSURE: 88 MMHG | HEIGHT: 76 IN | BODY MASS INDEX: 31.42 KG/M2 | WEIGHT: 258 LBS | RESPIRATION RATE: 16 BRPM | SYSTOLIC BLOOD PRESSURE: 144 MMHG | HEART RATE: 65 BPM | TEMPERATURE: 98.6 F

## 2023-08-29 DIAGNOSIS — R35.0 URINARY FREQUENCY: Primary | ICD-10-CM

## 2023-08-29 LAB
ALBUMIN UR-MCNC: NEGATIVE MG/DL
APPEARANCE UR: CLEAR
BACTERIA #/AREA URNS HPF: ABNORMAL /HPF
BILIRUB UR QL STRIP: NEGATIVE
COLOR UR AUTO: YELLOW
GLUCOSE UR STRIP-MCNC: NEGATIVE MG/DL
HBA1C MFR BLD: 5.4 % (ref 0–5.6)
HGB UR QL STRIP: NEGATIVE
KETONES UR STRIP-MCNC: NEGATIVE MG/DL
LEUKOCYTE ESTERASE UR QL STRIP: NEGATIVE
NITRATE UR QL: NEGATIVE
PH UR STRIP: 7 [PH] (ref 5–8)
RBC #/AREA URNS AUTO: ABNORMAL /HPF
SP GR UR STRIP: 1.01 (ref 1–1.03)
SQUAMOUS #/AREA URNS AUTO: ABNORMAL /LPF
UROBILINOGEN UR STRIP-ACNC: 0.2 E.U./DL
WBC #/AREA URNS AUTO: ABNORMAL /HPF

## 2023-08-29 PROCEDURE — 36415 COLL VENOUS BLD VENIPUNCTURE: CPT | Performed by: STUDENT IN AN ORGANIZED HEALTH CARE EDUCATION/TRAINING PROGRAM

## 2023-08-29 PROCEDURE — 99213 OFFICE O/P EST LOW 20 MIN: CPT | Performed by: STUDENT IN AN ORGANIZED HEALTH CARE EDUCATION/TRAINING PROGRAM

## 2023-08-29 PROCEDURE — 83036 HEMOGLOBIN GLYCOSYLATED A1C: CPT | Performed by: STUDENT IN AN ORGANIZED HEALTH CARE EDUCATION/TRAINING PROGRAM

## 2023-08-29 PROCEDURE — 87591 N.GONORRHOEAE DNA AMP PROB: CPT | Performed by: STUDENT IN AN ORGANIZED HEALTH CARE EDUCATION/TRAINING PROGRAM

## 2023-08-29 PROCEDURE — 87491 CHLMYD TRACH DNA AMP PROBE: CPT | Performed by: STUDENT IN AN ORGANIZED HEALTH CARE EDUCATION/TRAINING PROGRAM

## 2023-08-29 PROCEDURE — 81001 URINALYSIS AUTO W/SCOPE: CPT | Performed by: STUDENT IN AN ORGANIZED HEALTH CARE EDUCATION/TRAINING PROGRAM

## 2023-08-29 ASSESSMENT — PATIENT HEALTH QUESTIONNAIRE - PHQ9
SUM OF ALL RESPONSES TO PHQ QUESTIONS 1-9: 2
SUM OF ALL RESPONSES TO PHQ QUESTIONS 1-9: 2

## 2023-08-29 NOTE — PROGRESS NOTES
"  Assessment & Plan     Urinary frequency  Urinary frequency for a week now.  Associated with some dysuria, possible flank pain.  Denied fevers, hematuria, or history of kidney stones.  No CVA tenderness on exam.  Afebrile.  Will check urine, screen for diabetes, chlamydia and gonorrhea.  Treat as indicated.  - UA with Microscopic reflex to Culture - Clinic Collect  - Hemoglobin A1c  - Chlamydia & Gonorrhea by PCR, G[facility]/Range - Clinic Collect  - Hemoglobin A1c      Review of external notes as documented elsewhere in note       BMI:   Estimated body mass index is 31.4 kg/m  as calculated from the following:    Height as of this encounter: 1.93 m (6' 4\").    Weight as of this encounter: 117 kg (258 lb).       Tosin Kent MD  [facility]    Anamaria Sales is a 33 year old, presenting for the following health issues:  urine (Pt state that pain in urine and having to use the bathroom a lot. )        8/29/2023     1:39 PM   Additional Questions   Roomed by hser   Accompanied by self       History of Present Illness       Reason for visit:  Frequently having to pee  Symptom onset:  3-7 days ago  Symptoms include:  Having to pee alot, sometime a burning sensation  Symptom intensity:  Mild  Symptom progression:  Staying the same  Had these symptoms before:  No    He eats 0-1 servings of fruits and vegetables daily.He consumes 1 sweetened beverage(s) daily.He exercises with enough effort to increase his heart rate 9 or less minutes per day.  He exercises with enough effort to increase his heart rate 3 or less days per week. He is missing 2 dose(s) of medications per week.     Denies fever, flank pain or blood in urine.     Review of Systems   Constitutional: Negative for fever and chills.   Respiratory: Negative for cough or shortness of breath.    Cardiovascular: Negative for chest pain or chest pressure.   Gastrointestinal: Negative for nausea, vomiting, diarrhea or abdominal pain.      " "  Objective    BP (!) 144/88 (BP Location: Left arm, Patient Position: Sitting, Cuff Size: Adult Regular)   Pulse 65   Temp 98.6  F (37  C) (Temporal)   Resp 16   Ht 1.93 m (6' 4\")   Wt 117 kg (258 lb)   SpO2 98%   BMI 31.40 kg/m    Body mass index is 31.4 kg/m .  Physical Exam   General Appearance:  Alert, cooperative, no distress, appears stated age.  Head:  Normocephalic, without obvious abnormality, atraumatic.  Eyes:  Conjunctivae/corneas clear, extraocular movements intact both eyes.  Lungs:  respirations unlabored.  Abdomen:  Soft, non-tender, bowel sounds active all four quadrants.No CVA tenderness.   Extremities:  Atraumatic, no cyanosis or edema.  Skin:  Skin color, texture, turgor normal, no rashes or lesions.  Neurologic: No focal deficits.        Prior to immunization administration, verified patients identity using patient s name and date of birth. Please see Immunization Activity for additional information.     Screening Questionnaire for Adult Immunization    Are you sick today?   Yes   Do you have allergies to medications, food, a vaccine component or latex?   No   Have you ever had a serious reaction after receiving a vaccination?   No   Do you have a long-term health problem with heart, lung, kidney, or metabolic disease (e.g., diabetes), asthma, a blood disorder, no spleen, complement component deficiency, a cochlear implant, or a spinal fluid leak?  Are you on long-term aspirin therapy?   No   Do you have cancer, leukemia, HIV/AIDS, or any other immune system problem?   No   Do you have a parent, brother, or sister with an immune system problem?   No   In the past 3 months, have you taken medications that affect  your immune system, such as prednisone, other steroids, or anticancer drugs; drugs for the treatment of rheumatoid arthritis, Crohn s disease, or psoriasis; or have you had radiation treatments?   No   Have you had a seizure, or a brain or other nervous system problem?   No "   During the past year, have you received a transfusion of blood or blood    products, or been given immune (gamma) globulin or antiviral drug?   No   For women: Are you pregnant or is there a chance you could become       pregnant during the next month?   No   Have you received any vaccinations in the past 4 weeks?   No     Immunization questionnaire was positive for at least one answer.  Notified provider.      Patient instructed to remain in clinic for 15 minutes afterwards, and to report any adverse reactions.     Screening performed by Jo Ann England MA on 8/29/2023 at 1:43 PM.

## 2023-08-30 LAB
C TRACH DNA SPEC QL PROBE+SIG AMP: NEGATIVE
N GONORRHOEA DNA SPEC QL NAA+PROBE: NEGATIVE

## 2023-09-18 ENCOUNTER — MYC MEDICAL ADVICE (OUTPATIENT)
Dept: FAMILY MEDICINE | Facility: CLINIC | Age: 33
End: 2023-09-18
Payer: COMMERCIAL

## 2024-01-16 ENCOUNTER — OFFICE VISIT (OUTPATIENT)
Dept: FAMILY MEDICINE | Facility: CLINIC | Age: 34
End: 2024-01-16
Payer: COMMERCIAL

## 2024-01-16 VITALS
HEART RATE: 66 BPM | DIASTOLIC BLOOD PRESSURE: 82 MMHG | HEIGHT: 75 IN | BODY MASS INDEX: 32.58 KG/M2 | OXYGEN SATURATION: 97 % | TEMPERATURE: 97.3 F | WEIGHT: 262 LBS | SYSTOLIC BLOOD PRESSURE: 127 MMHG | RESPIRATION RATE: 16 BRPM

## 2024-01-16 DIAGNOSIS — Z00.00 ROUTINE GENERAL MEDICAL EXAMINATION AT A HEALTH CARE FACILITY: Primary | ICD-10-CM

## 2024-01-16 DIAGNOSIS — F90.2 ADHD (ATTENTION DEFICIT HYPERACTIVITY DISORDER), COMBINED TYPE: ICD-10-CM

## 2024-01-16 DIAGNOSIS — E66.09 CLASS 1 OBESITY DUE TO EXCESS CALORIES WITHOUT SERIOUS COMORBIDITY WITH BODY MASS INDEX (BMI) OF 32.0 TO 32.9 IN ADULT: ICD-10-CM

## 2024-01-16 DIAGNOSIS — Z13.1 SCREENING FOR DIABETES MELLITUS: ICD-10-CM

## 2024-01-16 DIAGNOSIS — Z13.220 SCREENING CHOLESTEROL LEVEL: ICD-10-CM

## 2024-01-16 DIAGNOSIS — R10.32 LEFT INGUINAL PAIN: ICD-10-CM

## 2024-01-16 DIAGNOSIS — Z86.69 HISTORY OF MIGRAINE HEADACHES: ICD-10-CM

## 2024-01-16 DIAGNOSIS — E66.811 CLASS 1 OBESITY DUE TO EXCESS CALORIES WITHOUT SERIOUS COMORBIDITY WITH BODY MASS INDEX (BMI) OF 32.0 TO 32.9 IN ADULT: ICD-10-CM

## 2024-01-16 DIAGNOSIS — Z87.19 HISTORY OF LEFT INGUINAL HERNIA REPAIR: ICD-10-CM

## 2024-01-16 DIAGNOSIS — Z98.890 HISTORY OF LEFT INGUINAL HERNIA REPAIR: ICD-10-CM

## 2024-01-16 DIAGNOSIS — F33.1 MODERATE EPISODE OF RECURRENT MAJOR DEPRESSIVE DISORDER (H): ICD-10-CM

## 2024-01-16 DIAGNOSIS — F41.9 ANXIETY: ICD-10-CM

## 2024-01-16 PROCEDURE — 90471 IMMUNIZATION ADMIN: CPT | Performed by: FAMILY MEDICINE

## 2024-01-16 PROCEDURE — 90636 HEP A/HEP B VACC ADULT IM: CPT | Performed by: FAMILY MEDICINE

## 2024-01-16 PROCEDURE — 99395 PREV VISIT EST AGE 18-39: CPT | Mod: 25 | Performed by: FAMILY MEDICINE

## 2024-01-16 PROCEDURE — 99213 OFFICE O/P EST LOW 20 MIN: CPT | Mod: 25 | Performed by: FAMILY MEDICINE

## 2024-01-16 RX ORDER — LISDEXAMFETAMINE DIMESYLATE 20 MG/1
20 CAPSULE ORAL EVERY MORNING
Qty: 30 CAPSULE | Refills: 0 | Status: SHIPPED | OUTPATIENT
Start: 2024-01-16

## 2024-01-16 ASSESSMENT — ENCOUNTER SYMPTOMS
EYE PAIN: 0
DIZZINESS: 0
NERVOUS/ANXIOUS: 0
HEARTBURN: 0
WEAKNESS: 0
CHILLS: 0
SHORTNESS OF BREATH: 0
HEMATOCHEZIA: 0
MYALGIAS: 0
FREQUENCY: 0
DYSURIA: 0
DIARRHEA: 0
JOINT SWELLING: 0
ABDOMINAL PAIN: 1
CONSTIPATION: 0
COUGH: 0
HEADACHES: 0
PALPITATIONS: 0
NAUSEA: 0
FEVER: 0
PARESTHESIAS: 0
ARTHRALGIAS: 0
SORE THROAT: 0
HEMATURIA: 0

## 2024-01-16 ASSESSMENT — PAIN SCALES - GENERAL: PAINLEVEL: NO PAIN (0)

## 2024-01-16 NOTE — PROGRESS NOTES
SUBJECTIVE:   Henrry is a 33 year old, presenting for the following:  Physical (Adhd AND STOMACH PAIN WHERE HERNIA WAS)        1/16/2024     1:45 PM   Additional Questions   Roomed by FERNANDA RODRIGUEZ       Healthy Habits:     Getting at least 3 servings of Calcium per day:  Yes    Bi-annual eye exam:  Yes    Dental care twice a year:  Yes    Sleep apnea or symptoms of sleep apnea:  None    Diet:  Regular (no restrictions)    Frequency of exercise:  2-3 days/week    Duration of exercise:  15-30 minutes    Taking medications regularly:  Yes    Medication side effects:  None    Additional concerns today:  Yes    He has a history of ADHD combined type, depression and anxiety.  He was on Adderall 20 mg twice daily, but he stopped taking this because it was making him feel irritable at the end of the day.  He has noticed that there is room for improvement now in controlling the ADHD symptoms and he is wondering about possibly starting a different type of medication.  He has also been on methylphenidate for this in the past which he did not tolerate well.  He has been on sertraline in the past for depression and anxiety symptoms, but he has not tried Wellbutrin.     He also has a history of migraine headaches and has sumatriptan available as needed.  He reports not needing this medication for a while.  He notes that processed meats have been a trigger for this and as long as he avoids this type of food he tends not to get headaches.      He has a history of an umbilical hernia repair on 3/11/2022.  During that surgery they noticed that he also had a left inguinal hernia which they ended up repairing on 12/5/2022.  During that second surgery they noticed some adhesions that they took down.  He expresses concerns about having some lingering left lower abdominal pain symptoms ever since the inguinal hernia repair surgery.  He has not noticed significant bulging, but he is interested in seeing another surgeon about this and whether  "or not anything more needs to be done.    Social history: , his wife is due with their first child in about a month.                    Social History     Tobacco Use    Smoking status: Never    Smokeless tobacco: Never   Substance Use Topics    Alcohol use: Yes     Types: 5 Cans of beer per week     Comment: one drink per week             1/16/2024     1:31 PM   Alcohol Use   Prescreen: >3 drinks/day or >7 drinks/week? Yes   AUDIT SCORE  8         1/16/2024     1:31 PM   AUDIT - Alcohol Use Disorders Identification Test - Reproduced from the World Health Organization Audit 2001 (Second Edition)   1.  How often do you have a drink containing alcohol? 2 to 3 times a week   2.  How many drinks containing alcohol do you have on a typical day when you are drinking? 3 or 4   3.  How often do you have five or more drinks on one occasion? Monthly   4.  How often during the last year have you found that you were not able to stop drinking once you had started? Never   5.  How often during the last year have you failed to do what was normally expected of you because of drinking? Never   6.  How often during the last year have you needed a first drink in the morning to get yourself going after a heavy drinking session? Less than monthly   7.  How often during the last year have you had a feeling of guilt or remorse after drinking? Never   8.  How often during the last year have you been unable to remember what happened the night before because of your drinking? Less than monthly   9.  Have you or someone else been injured because of your drinking? No   10. Has a relative, friend, doctor or other health care worker been concerned about your drinking or suggested you cut down? No   TOTAL SCORE 8       Last PSA: No results found for: \"PSA\"    Reviewed orders with patient. Reviewed health maintenance and updated orders accordingly - Yes  Lab work is in process  Labs reviewed in EPIC    Reviewed and updated as needed this " "visit by clinical staff   Tobacco  Allergies  Meds              Reviewed and updated as needed this visit by Provider                     Review of Systems   Constitutional:  Negative for chills and fever.   HENT:  Negative for congestion, ear pain, hearing loss and sore throat.    Eyes:  Negative for pain and visual disturbance.   Respiratory:  Negative for cough and shortness of breath.    Cardiovascular:  Negative for chest pain, palpitations and peripheral edema.   Gastrointestinal:  Positive for abdominal pain. Negative for constipation, diarrhea, heartburn, hematochezia and nausea.   Genitourinary:  Negative for dysuria, frequency, genital sores, hematuria, impotence, penile discharge and urgency.   Musculoskeletal:  Negative for arthralgias, joint swelling and myalgias.   Skin:  Negative for rash.   Neurological:  Negative for dizziness, weakness, headaches and paresthesias.   Psychiatric/Behavioral:  Negative for mood changes. The patient is not nervous/anxious.          OBJECTIVE:   Pulse 66   Temp 97.3  F (36.3  C) (Temporal)   Resp 16   Ht 1.899 m (6' 2.75\")   Wt 118.8 kg (262 lb)   SpO2 97%   BMI 32.97 kg/m      Physical Exam  GENERAL: healthy, alert and no distress  EYES: Eyes grossly normal to inspection, PERRL and conjunctivae and sclerae normal  HENT: ear canals and TM's normal, nose and mouth without ulcers or lesions  NECK: no adenopathy, no asymmetry, masses, or scars and thyroid normal to palpation  RESP: lungs clear to auscultation - no rales, rhonchi or wheezes  CV: regular rate and rhythm, normal S1 S2, no S3 or S4, no murmur, click or rub, no peripheral edema and peripheral pulses strong  ABDOMEN: soft, nontender, no hepatosplenomegaly, no masses and bowel sounds normal  MS: no gross musculoskeletal defects noted, no edema  SKIN: no suspicious lesions or rashes  NEURO: Normal strength and tone, mentation intact and speech normal  PSYCH: mentation appears normal, affect " normal/bright    Diagnostic Test Results:  Labs reviewed in Epic    ASSESSMENT/PLAN:       ICD-10-CM    1. Routine general medical examination at a health care facility  Z00.00 He is going to schedule lab only appointment to check labs as listed below when he is fasting.      2. ADHD (attention deficit hyperactivity disorder), combined type  F90.2 He stopped taking Adderall because it was making him feel irritable at the end of the day.  We discussed other options and decided to try Vyvanse 20 mg daily.  He has been on methylphenidate in the past which also caused unwanted side effects.  He is going to schedule follow-up appointment with me in about a month to check in and see how things are going.    lisdexamfetamine (VYVANSE) 20 MG capsule      3. Moderate episode of recurrent major depressive disorder (H)  F33.1 He feels like depression and anxiety symptoms are stable without medication.  We briefly discussed the possibility of trying Wellbutrin to see if it would help with ADHD symptoms, but decided to go with the Vyvanse for now.      4. Anxiety  F41.9 He feels like anxiety symptoms are stable.      5. History of migraine headaches  Z86.69 He has sumatriptan available when needed, but as long as he avoids deli meat the migraines do not seem to be a problem.      6. Left inguinal pain  R10.32 He continues to struggle with left lower abdominal/inguinal pain symptoms ever since his inguinal hernia repair on 12/5/2022.  He was given a referral to see a general surgeon again for consultation    Adult General Surg Referral      7. History of left inguinal hernia repair  Z98.890 Adult General Surg Referral    Z87.19       8. Class 1 obesity due to excess calories without serious comorbidity with body mass index (BMI) of 32.0 to 32.9 in adult  E66.09 He will continue to work on lifestyle modifications to help with weight loss.    Z68.32       9. Screening cholesterol level  Z13.220 Lipid Profile (Chol, Trig, HDL, LDL  "calc)      10. Screening for diabetes mellitus  Z13.1 Basic metabolic panel  (Ca, Cl, CO2, Creat, Gluc, K, Na, BUN)          Patient has been advised of split billing requirements and indicates understanding: Yes      COUNSELING:   Reviewed preventive health counseling, as reflected in patient instructions       Regular exercise       Healthy diet/nutrition      BMI:   Estimated body mass index is 32.97 kg/m  as calculated from the following:    Height as of this encounter: 1.899 m (6' 2.75\").    Weight as of this encounter: 118.8 kg (262 lb).   Weight management plan: Discussed healthy diet and exercise guidelines      He reports that he has never smoked. He has never used smokeless tobacco.            Guru Augustine, [facility]  "

## 2024-03-04 ENCOUNTER — OFFICE VISIT (OUTPATIENT)
Dept: SURGERY | Facility: CLINIC | Age: 34
End: 2024-03-04
Payer: COMMERCIAL

## 2024-03-04 VITALS
BODY MASS INDEX: 33.5 KG/M2 | SYSTOLIC BLOOD PRESSURE: 130 MMHG | HEIGHT: 74 IN | WEIGHT: 261 LBS | HEART RATE: 62 BPM | DIASTOLIC BLOOD PRESSURE: 60 MMHG

## 2024-03-04 DIAGNOSIS — R10.32 LEFT INGUINAL PAIN: ICD-10-CM

## 2024-03-04 DIAGNOSIS — Z87.19 HISTORY OF LEFT INGUINAL HERNIA REPAIR: ICD-10-CM

## 2024-03-04 DIAGNOSIS — K40.91 RECURRENT LEFT INGUINAL HERNIA: Primary | ICD-10-CM

## 2024-03-04 DIAGNOSIS — Z98.890 HISTORY OF LEFT INGUINAL HERNIA REPAIR: ICD-10-CM

## 2024-03-04 PROCEDURE — 99203 OFFICE O/P NEW LOW 30 MIN: CPT | Performed by: SURGERY

## 2024-03-04 NOTE — PROGRESS NOTES
Patient seen in consultation for left inguinal pain by Guru Vásquez    HPI:  Patient is a 33 year old male  with complaints of left inguinal pain  Feels like a poking usually when sitting. Lifting feels OK. Does feel a lump in the area that he can push in  The patient noticed the symptoms about 3-4 months after robotic repair in 12/2022.  Symptoms are not all the time, seems to be maybe few times a month on average  Standing and walking makes the episode better.    Review Of Systems    Skin: negative  Ears/Nose/Throat: negative  Respiratory: No shortness of breath, dyspnea on exertion, cough, or hemoptysis  Cardiovascular: negative  Gastrointestinal: has noticed more gas since the hernia repair  Genitourinary: negative  Musculoskeletal: as above  Neurologic: migraine headaches history, has not needed medications in some time  Hematologic/Lymphatic/Immunologic: negative  Endocrine: negative      No past medical history on file.    Past Surgical History:   Procedure Laterality Date    ANTERIOR CRUCIATE LIGAMENT REPAIR Right 2012, 2014    DAVINCI XI HERNIORRHAPHY INGUINAL Left 12/5/2022    Procedure: HERNIORRHAPHY, INGUINAL, ROBOT-ASSISTED, LAPAROSCOPIC, USING DA KIANNA XI, LYSIS OF ADHESIONS;  Surgeon: Shay Nguyen DO;  Location: [facility] OR    HERNIA REPAIR      HERNIORRHAPHY, UMBILICAL, ROBOT-ASSISTED, LAPAROSCOPIC, USING DA KIANNA XI N/A 03/11/2022    Procedure: HERNIORRHAPHY, UMBILICAL, ROBOT-ASSISTED, LAPAROSCOPIC, USING DA KIANNA XI;  Surgeon: Shay Nguyen DO;  Location: [facility] Main OR       Social History     Socioeconomic History    Marital status: Single     Spouse name: Not on file    Number of children: Not on file    Years of education: Not on file    Highest education level: Not on file   Occupational History    Not on file   Tobacco Use    Smoking status: Never    Smokeless tobacco: Never   Vaping Use    Vaping Use: Never used   Substance and Sexual Activity    Alcohol use: Yes     Types:  5 Cans of beer per week     Comment: one drink per week    Drug use: Never    Sexual activity: Yes     Partners: Female   Other Topics Concern    Parent/sibling w/ CABG, MI or angioplasty before 65F 55M? Not Asked   Social History Narrative    Not on file     Social Determinants of Health     Financial Resource Strain: Low Risk  (1/16/2024)    Financial Resource Strain     Within the past 12 months, have you or your family members you live with been unable to get utilities (heat, electricity) when it was really needed?: No   Food Insecurity: Low Risk  (1/16/2024)    Food Insecurity     Within the past 12 months, did you worry that your food would run out before you got money to buy more?: No     Within the past 12 months, did the food you bought just not last and you didn t have money to get more?: No   Transportation Needs: Low Risk  (1/16/2024)    Transportation Needs     Within the past 12 months, has lack of transportation kept you from medical appointments, getting your medicines, non-medical meetings or appointments, work, or from getting things that you need?: No   Physical Activity: Not on file   Stress: Not on file   Social Connections: Not on file   Interpersonal Safety: Low Risk  (1/16/2024)    Interpersonal Safety     Do you feel physically and emotionally safe where you currently live?: Yes     Within the past 12 months, have you been hit, slapped, kicked or otherwise physically hurt by someone?: No     Within the past 12 months, have you been humiliated or emotionally abused in other ways by your partner or ex-partner?: No   Housing Stability: Low Risk  (1/16/2024)    Housing Stability     Do you have housing? : Yes     Are you worried about losing your housing?: No       Current Outpatient Medications   Medication Sig Dispense Refill    lisdexamfetamine (VYVANSE) 20 MG capsule Take 1 capsule (20 mg) by mouth every morning 30 capsule 0    SUMAtriptan (IMITREX) 50 MG tablet Take  mg by mouth as  "needed         Medications and history reviewed    Physical exam:  Vitals: /60   Pulse 62   Ht 1.88 m (6' 2\")   Wt 118.4 kg (261 lb)   BMI 33.51 kg/m    BMI= Body mass index is 33.51 kg/m .    Constitutional: healthy, alert, and no distress  Head: Normocephalic. No masses, lesions, tenderness or abnormalities  Gastrointestinal: Abdomen soft, non-tender. BS normal. No masses, organomegaly. Well healed scars from previous robotic repair  : Normal external genitalia without lesions and male positive for hernia or other bulge in left inguinal canal possibly cord lipoma  Musculoskeletal: extremities normal- no gross deformities noted, gait normal, and normal muscle tone  Skin: no suspicious lesions or rashes  Psychiatric: mentation appears normal and affect normal/bright      Assessment:     ICD-10-CM    1. Recurrent left inguinal hernia  K40.91       2. Left inguinal pain  R10.32 Adult General Surg Referral      3. History of left inguinal hernia repair  Z98.890 Adult General Surg Referral    Z87.19         Plan: By exam has recurrence of hernia though another possibility would be cord lipoma that had been left after the previous repair.  Regardless this is causing symptoms so do recommend repair again.  Would approach open given the previous robotic repair.  Imaging would not change recommendations so do not see any need for that at this time.  Right now he would like to wait on repair until later on the year as he has a bunch of activities and things he wants to get done first.  Card provided so we can give a call when ready to work on scheduling.    Marty Gilman MD    "

## 2024-12-17 ENCOUNTER — PATIENT OUTREACH (OUTPATIENT)
Dept: CARE COORDINATION | Facility: CLINIC | Age: 34
End: 2024-12-17
Payer: COMMERCIAL

## 2024-12-31 ENCOUNTER — PATIENT OUTREACH (OUTPATIENT)
Dept: CARE COORDINATION | Facility: CLINIC | Age: 34
End: 2024-12-31
Payer: COMMERCIAL

## 2025-03-09 ENCOUNTER — HEALTH MAINTENANCE LETTER (OUTPATIENT)
Age: 35
End: 2025-03-09

## 2025-04-20 DIAGNOSIS — F90.2 ADHD (ATTENTION DEFICIT HYPERACTIVITY DISORDER), COMBINED TYPE: ICD-10-CM

## 2025-04-21 RX ORDER — ATOMOXETINE 40 MG/1
40 CAPSULE ORAL 2 TIMES DAILY
Qty: 180 CAPSULE | Refills: 1 | Status: SHIPPED | OUTPATIENT
Start: 2025-04-21

## (undated) DEVICE — GLOVE UNDER INDICATOR PI SZ 7.0 LF 41670

## (undated) DEVICE — SUTURE VICRYL+ 2-0 27IN SH UND VCP417H

## (undated) DEVICE — DAVINCI XI DRAPE COLUMN 470341

## (undated) DEVICE — ADH SKIN CLOSURE PREMIERPRO EXOFIN 1.0ML 3470

## (undated) DEVICE — DAVINCI XI DRAPE ARM 470015

## (undated) DEVICE — SYR 50ML SLIP TIP W/O NDL 309654

## (undated) DEVICE — TUBING SMOKE EVAC PNEUMOCLEAR HIGH FLOW 0620050250

## (undated) DEVICE — DRAPE U SPLIT 74X120" 29440

## (undated) DEVICE — SU WND CLOSURE V-LOC 90 SZ 2-0 12" GS-21 VLOCM0315

## (undated) DEVICE — CUSTOM PACK LAP CHOLE SBA5BLCHEA

## (undated) DEVICE — Device

## (undated) DEVICE — SUTURE VICRYL+ 4-0 UNDYED PS-2 VCP496H

## (undated) DEVICE — NDL INSUFFLATION 13GA 120MM C2201

## (undated) DEVICE — PREP CHLORAPREP 26ML TINTED HI-LITE ORANGE 930815

## (undated) DEVICE — SYR 30ML LL W/O NDL 302832

## (undated) DEVICE — SOL WATER IRRIG 1000ML BOTTLE 2F7114

## (undated) DEVICE — KIT PATIENT POSITIONING PIGAZZI LATEX FREE 40580

## (undated) DEVICE — DAVINCI XI SEAL UNIVERSAL 5-8MM 470361

## (undated) DEVICE — SU VICRYL+ 3-0 27IN SH UND VCP416H

## (undated) DEVICE — DAVINCI XI OBTURATOR BLADELESS 8MM 470359

## (undated) DEVICE — BLADE KNIFE SURG 11 371111

## (undated) DEVICE — DAVINCI HOT SHEARS TIP COVER  400180

## (undated) DEVICE — LUBRICANT INST ELECTROLUBE EL101

## (undated) DEVICE — PROTECTOR ARM STANDARD ONE STEP

## (undated) DEVICE — DRAPE MAYO STAND 29.5X55.5" 8339

## (undated) DEVICE — DRAPE SHEET REV FOLD 3/4 9349

## (undated) DEVICE — DRAPE SHEET TABLE COVER KC 42301*

## (undated) DEVICE — BINDER ABDOMINAL 3PANEL LG 45IN 08140345

## (undated) DEVICE — DECANTER VIAL 2006S

## (undated) DEVICE — SYSTEM CLEARIFY VISUALIZATION 21-345

## (undated) DEVICE — GOWN XLG DISP 9545

## (undated) DEVICE — MARKER SURG SKIN STRL 77734

## (undated) DEVICE — ENDO TROCAR FIRST ENTRY KII FIOS Z-THRD 12X100MM CTF73

## (undated) DEVICE — SU PDS II 2-0 SH 27" Z317H

## (undated) DEVICE — PAD POS XL 1X20X40IN PINK PIGAZZI

## (undated) DEVICE — ENDO TROCAR FIRST ENTRY KII FIOS Z-THRD 05X100MM CTF03

## (undated) DEVICE — ENDO SHEARS RENEW LAP ENDOCUT SCISSOR TIP 16.5MM 3142

## (undated) RX ORDER — FENTANYL CITRATE 50 UG/ML
INJECTION, SOLUTION INTRAMUSCULAR; INTRAVENOUS
Status: DISPENSED
Start: 2022-03-11

## (undated) RX ORDER — ONDANSETRON 2 MG/ML
INJECTION INTRAMUSCULAR; INTRAVENOUS
Status: DISPENSED
Start: 2022-03-11

## (undated) RX ORDER — LIDOCAINE HYDROCHLORIDE 10 MG/ML
INJECTION, SOLUTION EPIDURAL; INFILTRATION; INTRACAUDAL; PERINEURAL
Status: DISPENSED
Start: 2022-12-05

## (undated) RX ORDER — DEXAMETHASONE SODIUM PHOSPHATE 10 MG/ML
INJECTION INTRAMUSCULAR; INTRAVENOUS
Status: DISPENSED
Start: 2022-03-11

## (undated) RX ORDER — ONDANSETRON 2 MG/ML
INJECTION INTRAMUSCULAR; INTRAVENOUS
Status: DISPENSED
Start: 2022-12-05

## (undated) RX ORDER — PROPOFOL 10 MG/ML
INJECTION, EMULSION INTRAVENOUS
Status: DISPENSED
Start: 2022-12-05

## (undated) RX ORDER — PROPOFOL 10 MG/ML
INJECTION, EMULSION INTRAVENOUS
Status: DISPENSED
Start: 2022-03-11

## (undated) RX ORDER — LIDOCAINE HYDROCHLORIDE AND EPINEPHRINE 10; 10 MG/ML; UG/ML
INJECTION, SOLUTION INFILTRATION; PERINEURAL
Status: DISPENSED
Start: 2022-03-11

## (undated) RX ORDER — KETOROLAC TROMETHAMINE 30 MG/ML
INJECTION, SOLUTION INTRAMUSCULAR; INTRAVENOUS
Status: DISPENSED
Start: 2022-12-05

## (undated) RX ORDER — LIDOCAINE HYDROCHLORIDE 20 MG/ML
INJECTION, SOLUTION INFILTRATION; PERINEURAL
Status: DISPENSED
Start: 2022-03-11

## (undated) RX ORDER — NEOSTIGMINE METHYLSULFATE 1 MG/ML
VIAL (ML) INJECTION
Status: DISPENSED
Start: 2022-03-11

## (undated) RX ORDER — FENTANYL CITRATE 50 UG/ML
INJECTION, SOLUTION INTRAMUSCULAR; INTRAVENOUS
Status: DISPENSED
Start: 2022-12-05

## (undated) RX ORDER — GLYCOPYRROLATE 0.2 MG/ML
INJECTION INTRAMUSCULAR; INTRAVENOUS
Status: DISPENSED
Start: 2022-03-11

## (undated) RX ORDER — DEXAMETHASONE SODIUM PHOSPHATE 10 MG/ML
INJECTION, EMULSION INTRAMUSCULAR; INTRAVENOUS
Status: DISPENSED
Start: 2022-12-05